# Patient Record
Sex: MALE | Race: WHITE | NOT HISPANIC OR LATINO
[De-identification: names, ages, dates, MRNs, and addresses within clinical notes are randomized per-mention and may not be internally consistent; named-entity substitution may affect disease eponyms.]

---

## 2019-03-15 PROBLEM — Z00.00 ENCOUNTER FOR PREVENTIVE HEALTH EXAMINATION: Status: ACTIVE | Noted: 2019-03-15

## 2020-03-04 ENCOUNTER — LABORATORY RESULT (OUTPATIENT)
Age: 40
End: 2020-03-04

## 2020-03-04 ENCOUNTER — APPOINTMENT (OUTPATIENT)
Dept: RHEUMATOLOGY | Facility: CLINIC | Age: 40
End: 2020-03-04
Payer: COMMERCIAL

## 2020-03-04 VITALS
WEIGHT: 215 LBS | SYSTOLIC BLOOD PRESSURE: 140 MMHG | BODY MASS INDEX: 29.12 KG/M2 | HEIGHT: 72 IN | TEMPERATURE: 98.1 F | DIASTOLIC BLOOD PRESSURE: 88 MMHG | RESPIRATION RATE: 17 BRPM

## 2020-03-04 PROCEDURE — 99245 OFF/OP CONSLTJ NEW/EST HI 55: CPT | Mod: 25

## 2020-03-04 PROCEDURE — 36415 COLL VENOUS BLD VENIPUNCTURE: CPT

## 2020-03-04 NOTE — PHYSICAL EXAM
[General Appearance - Alert] : alert [General Appearance - In No Acute Distress] : in no acute distress [General Appearance - Well Developed] : well developed [General Appearance - Well Nourished] : well nourished [Sclera] : the sclera and conjunctiva were normal [PERRL With Normal Accommodation] : pupils were equal in size, round, and reactive to light [Outer Ear] : the ears and nose were normal in appearance [Examination Of The Oral Cavity] : the lips and gums were normal [Oropharynx] : the oropharynx was normal [Neck Appearance] : the appearance of the neck was normal [Heart Rate And Rhythm] : heart rate was normal and rhythm regular [Auscultation Breath Sounds / Voice Sounds] : lungs were clear to auscultation bilaterally [Heart Sounds] : normal S1 and S2 [Heart Sounds Gallop] : no gallops [Murmurs] : no murmurs [Heart Sounds Pericardial Friction Rub] : no pericardial rub [Bowel Sounds] : normal bowel sounds [Abdomen Soft] : soft [Abdomen Tenderness] : non-tender [No CVA Tenderness] : no ~M costovertebral angle tenderness [No Spinal Tenderness] : no spinal tenderness [Nail Clubbing] : no clubbing  or cyanosis of the fingernails [Involuntary Movements] : no involuntary movements were seen [Motor Tone] : muscle strength and tone were normal [] : no rash [Skin Lesions] : no skin lesions [No Focal Deficits] : no focal deficits [Oriented To Time, Place, And Person] : oriented to person, place, and time [FreeTextEntry1] : Right ankle with mild swelling and warmth

## 2020-03-04 NOTE — ASSESSMENT
[FreeTextEntry1] : 39 year old male with PMH of gout- treated with allopurinol 50mg daily presenting today for an initial evaluation. Has acute Right ankle monoarthritis x 3 weeks now. Not currently taking any medications. PE with Right ankle mild synovitis and warmth. 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 \par \par - labs as below\par - Xray of BL ankles\par - prednisone taper  (reviewed risk and benefits of medications)\par - rest, ice, elevation of affected joint\par - will optimize ULT after reviewing labs\par \par Discussed treatment plan with the patient. The patient was given the opportunity to ask questions and all questions were answered to their satisfaction.

## 2020-03-04 NOTE — HISTORY OF PRESENT ILLNESS
[FreeTextEntry1] : 39 year old male with PMH as listed below presents today for an initial evaluation. \par \par Reports to have hx of gout dx > 7 years ago.  Was previously following rheumatologist (doesn’t remember name).  Treated with indomethacin, allopurinol 50mg daily ,colchicine 0.6 mg BID. \par \par Currently reports to have pain/swelling/erythema to Right ankle x 3 weeks now. Has tried colchicine 0.6 mg BID x 3-4 days with some improvement in symptoms. Currently off colchicine. Has had prior attacks to BL ankles, knees, elbows. Last attack > 6 months ago. denies hx of kidney stones. Currently on allopurinol 50mg daily. \par \par denies fever, chills, weight loss, weight gain, fatigue, malaise, denies dry eye,eye pain, eye redness, vision changes, dry mouth, oral ulcers, nasal congestion, difficulty swallowing,  denies ear pain, hearing changes, denies chest pain, chest palpitations, denies sob, denies nausea, vomiting, abdominal pain, diarrhea, constipation, blood in stool, denies dysuria, blood in the urine,denies rashes, photosensitivity, hair loss, skin thickening, denies blood clots,  raynauds

## 2020-03-18 ENCOUNTER — APPOINTMENT (OUTPATIENT)
Dept: RHEUMATOLOGY | Facility: CLINIC | Age: 40
End: 2020-03-18

## 2020-03-19 LAB
25(OH)D3 SERPL-MCNC: 32.3 NG/ML
ALBUMIN MFR SERPL ELPH: 58.5 %
ALBUMIN SERPL ELPH-MCNC: 4.9 G/DL
ALBUMIN SERPL-MCNC: 4.3 G/DL
ALBUMIN/GLOB SERPL: 1.4 RATIO
ALBUPE: 22.3 %
ALP BLD-CCNC: 58 U/L
ALPHA1 GLOB MFR SERPL ELPH: 4.6 %
ALPHA1 GLOB SERPL ELPH-MCNC: 0.3 G/DL
ALPHA1UPE: 38.4 %
ALPHA2 GLOB MFR SERPL ELPH: 9 %
ALPHA2 GLOB SERPL ELPH-MCNC: 0.7 G/DL
ALPHA2UPE: 18.2 %
ALT SERPL-CCNC: 14 U/L
ANION GAP SERPL CALC-SCNC: 14 MMOL/L
AST SERPL-CCNC: 14 U/L
B-GLOBULIN MFR SERPL ELPH: 12.5 %
B-GLOBULIN SERPL ELPH-MCNC: 0.9 G/DL
BASOPHILS # BLD AUTO: 0.08 K/UL
BASOPHILS NFR BLD AUTO: 0.9 %
BETAUPE: 7.4 %
BILIRUB SERPL-MCNC: 0.2 MG/DL
BUN SERPL-MCNC: 17 MG/DL
CALCIUM SERPL-MCNC: 9.8 MG/DL
CCP AB SER IA-ACNC: <8 UNITS
CHLORIDE SERPL-SCNC: 101 MMOL/L
CO2 SERPL-SCNC: 26 MMOL/L
CREAT 24H UR-MCNC: NORMAL G/24 H
CREAT SERPL-MCNC: 1.1 MG/DL
CREAT SPEC-SCNC: 76 MG/DL
CREAT/PROT UR: 0.1 RATIO
CREATININE UR (MAYO): 75 MG/DL
CRP SERPL-MCNC: 1.5 MG/DL
EOSINOPHIL # BLD AUTO: 0.13 K/UL
EOSINOPHIL NFR BLD AUTO: 1.5 %
ERYTHROCYTE [SEDIMENTATION RATE] IN BLOOD BY WESTERGREN METHOD: 14 MM/HR
GAMMA GLOB FLD ELPH-MCNC: 1.1 G/DL
GAMMA GLOB MFR SERPL ELPH: 15.4 %
GAMMAUPE: 13.7 %
GLUCOSE SERPL-MCNC: 104 MG/DL
HCT VFR BLD CALC: 47.3 %
HGB BLD-MCNC: 14.9 G/DL
IGA 24H UR QL IFE: NORMAL
IMM GRANULOCYTES NFR BLD AUTO: 0.3 %
INTERPRETATION SERPL IEP-IMP: NORMAL
KAPPA LC 24H UR QL: NORMAL
LYMPHOCYTES # BLD AUTO: 2.24 K/UL
LYMPHOCYTES NFR BLD AUTO: 25.7 %
M PROTEIN SPEC IFE-MCNC: NORMAL
MAN DIFF?: NORMAL
MCHC RBC-ENTMCNC: 29.1 PG
MCHC RBC-ENTMCNC: 31.5 GM/DL
MCV RBC AUTO: 92.4 FL
MONOCYTES # BLD AUTO: 0.42 K/UL
MONOCYTES NFR BLD AUTO: 4.8 %
NEUTROPHILS # BLD AUTO: 5.83 K/UL
NEUTROPHILS NFR BLD AUTO: 66.8 %
PLATELET # BLD AUTO: 297 K/UL
POTASSIUM SERPL-SCNC: 4.6 MMOL/L
PROT PATTERN 24H UR ELPH-IMP: NORMAL
PROT SERPL-MCNC: 7.4 G/DL
PROT UR-MCNC: 5 MG/DL
PROT UR-MCNC: <4 MG/DL
PROT UR-MCNC: <4 MG/DL
RBC # BLD: 5.12 M/UL
RBC # FLD: 12.4 %
RF+CCP IGG SER-IMP: NEGATIVE
RHEUMATOID FACT SER QL: <10 IU/ML
SODIUM SERPL-SCNC: 140 MMOL/L
SPECIMEN VOL 24H UR: NORMAL ML
TSH SERPL-ACNC: 2.93 UIU/ML
URATE SERPL-MCNC: 9 MG/DL
WBC # FLD AUTO: 8.73 K/UL

## 2020-03-23 ENCOUNTER — APPOINTMENT (OUTPATIENT)
Dept: RHEUMATOLOGY | Facility: CLINIC | Age: 40
End: 2020-03-23

## 2020-11-17 ENCOUNTER — APPOINTMENT (OUTPATIENT)
Dept: RHEUMATOLOGY | Facility: CLINIC | Age: 40
End: 2020-11-17
Payer: COMMERCIAL

## 2020-11-17 VITALS
HEART RATE: 80 BPM | HEIGHT: 72 IN | DIASTOLIC BLOOD PRESSURE: 82 MMHG | RESPIRATION RATE: 17 BRPM | WEIGHT: 220 LBS | OXYGEN SATURATION: 97 % | TEMPERATURE: 98.2 F | BODY MASS INDEX: 29.8 KG/M2 | SYSTOLIC BLOOD PRESSURE: 110 MMHG

## 2020-11-17 DIAGNOSIS — M13.10 MONOARTHRITIS, NOT ELSEWHERE CLASSIFIED, UNSPECIFIED SITE: ICD-10-CM

## 2020-11-17 DIAGNOSIS — M25.571 PAIN IN RIGHT ANKLE AND JOINTS OF RIGHT FOOT: ICD-10-CM

## 2020-11-17 DIAGNOSIS — M10.9 GOUT, UNSPECIFIED: ICD-10-CM

## 2020-11-17 DIAGNOSIS — Z79.899 OTHER LONG TERM (CURRENT) DRUG THERAPY: ICD-10-CM

## 2020-11-17 PROCEDURE — 99214 OFFICE O/P EST MOD 30 MIN: CPT | Mod: 25

## 2020-11-17 RX ORDER — COLCHICINE 0.6 MG/1
0.6 TABLET ORAL TWICE DAILY
Qty: 180 | Refills: 0 | Status: ACTIVE | COMMUNITY
Start: 2020-11-17 | End: 1900-01-01

## 2020-11-17 RX ORDER — ALLOPURINOL 100 MG/1
100 TABLET ORAL DAILY
Qty: 180 | Refills: 1 | Status: ACTIVE | COMMUNITY
Start: 2020-11-09 | End: 1900-01-01

## 2020-11-17 RX ORDER — OMEPRAZOLE 20 MG/1
20 CAPSULE, DELAYED RELEASE ORAL
Refills: 0 | Status: ACTIVE | COMMUNITY

## 2020-11-18 NOTE — ASSESSMENT
[FreeTextEntry1] : Gout\par No recent attacks\par Currently on allopurinol 200mg daily. Tolerating medication well. Denies side effects. \par Reports compliance with diet\par \par - repeat labs today. Goal UA <6\par - c/w allopurinol 200mg daily\par - prednisone prn for flares\par - will optimize ULT after reviewing labs\par \par Discussed treatment plan with the patient. The patient was given the opportunity to ask questions and all questions were answered to their satisfaction.

## 2020-11-18 NOTE — HISTORY OF PRESENT ILLNESS
[FreeTextEntry1] : Pt presenting today for a f.u visit. \par Currently on allopurinol 200mg daily. Tolerating medication well. Denies side effects. \par No recent gout attacks. Reports compliance with diet

## 2020-11-18 NOTE — PHYSICAL EXAM
[General Appearance - Alert] : alert [General Appearance - In No Acute Distress] : in no acute distress [General Appearance - Well Nourished] : well nourished [General Appearance - Well Developed] : well developed [Sclera] : the sclera and conjunctiva were normal [PERRL With Normal Accommodation] : pupils were equal in size, round, and reactive to light [Outer Ear] : the ears and nose were normal in appearance [Examination Of The Oral Cavity] : the lips and gums were normal [Oropharynx] : the oropharynx was normal [Neck Appearance] : the appearance of the neck was normal [Auscultation Breath Sounds / Voice Sounds] : lungs were clear to auscultation bilaterally [Heart Rate And Rhythm] : heart rate was normal and rhythm regular [Heart Sounds] : normal S1 and S2 [Heart Sounds Gallop] : no gallops [Murmurs] : no murmurs [Heart Sounds Pericardial Friction Rub] : no pericardial rub [Bowel Sounds] : normal bowel sounds [Abdomen Soft] : soft [Abdomen Tenderness] : non-tender [No CVA Tenderness] : no ~M costovertebral angle tenderness [No Spinal Tenderness] : no spinal tenderness [Nail Clubbing] : no clubbing  or cyanosis of the fingernails [Involuntary Movements] : no involuntary movements were seen [Motor Tone] : muscle strength and tone were normal [] : no rash [Skin Lesions] : no skin lesions [No Focal Deficits] : no focal deficits [Oriented To Time, Place, And Person] : oriented to person, place, and time [Abnormal Walk] : normal gait [Musculoskeletal - Swelling] : no joint swelling seen [Sensation] : the sensory exam was normal to light touch and pinprick [Motor Exam] : the motor exam was normal [Impaired Insight] : insight and judgment were intact [Affect] : the affect was normal [Mood] : the mood was normal

## 2020-12-14 LAB
ALBUMIN SERPL ELPH-MCNC: 4.8 G/DL
ALP BLD-CCNC: 67 U/L
ALT SERPL-CCNC: 30 U/L
ANION GAP SERPL CALC-SCNC: 10 MMOL/L
AST SERPL-CCNC: 30 U/L
BASOPHILS # BLD AUTO: 0.07 K/UL
BASOPHILS NFR BLD AUTO: 0.9 %
BILIRUB SERPL-MCNC: 0.5 MG/DL
BUN SERPL-MCNC: 14 MG/DL
CALCIUM SERPL-MCNC: 9.7 MG/DL
CHLORIDE SERPL-SCNC: 102 MMOL/L
CK SERPL-CCNC: 580 U/L
CO2 SERPL-SCNC: 27 MMOL/L
CREAT SERPL-MCNC: 1.2 MG/DL
CRP SERPL-MCNC: 1.9 MG/DL
EOSINOPHIL # BLD AUTO: 0.17 K/UL
EOSINOPHIL NFR BLD AUTO: 2.2 %
ERYTHROCYTE [SEDIMENTATION RATE] IN BLOOD BY WESTERGREN METHOD: 6 MM/HR
GLUCOSE SERPL-MCNC: 106 MG/DL
HCT VFR BLD CALC: 46.7 %
HGB BLD-MCNC: 15.4 G/DL
IMM GRANULOCYTES NFR BLD AUTO: 0.4 %
LYMPHOCYTES # BLD AUTO: 2.45 K/UL
LYMPHOCYTES NFR BLD AUTO: 31.7 %
MAN DIFF?: NORMAL
MCHC RBC-ENTMCNC: 30.1 PG
MCHC RBC-ENTMCNC: 33 GM/DL
MCV RBC AUTO: 91.2 FL
MONOCYTES # BLD AUTO: 0.65 K/UL
MONOCYTES NFR BLD AUTO: 8.4 %
NEUTROPHILS # BLD AUTO: 4.36 K/UL
NEUTROPHILS NFR BLD AUTO: 56.4 %
PLATELET # BLD AUTO: 266 K/UL
POTASSIUM SERPL-SCNC: 4.4 MMOL/L
PROT SERPL-MCNC: 6.7 G/DL
RBC # BLD: 5.12 M/UL
RBC # FLD: 12 %
SODIUM SERPL-SCNC: 139 MMOL/L
URATE SERPL-MCNC: 7.6 MG/DL
WBC # FLD AUTO: 7.73 K/UL

## 2021-02-22 ENCOUNTER — APPOINTMENT (OUTPATIENT)
Dept: RHEUMATOLOGY | Facility: CLINIC | Age: 41
End: 2021-02-22

## 2021-08-13 ENCOUNTER — APPOINTMENT (OUTPATIENT)
Dept: SURGERY | Facility: CLINIC | Age: 41
End: 2021-08-13
Payer: OTHER MISCELLANEOUS

## 2021-08-13 VITALS
DIASTOLIC BLOOD PRESSURE: 80 MMHG | RESPIRATION RATE: 15 BRPM | OXYGEN SATURATION: 96 % | HEIGHT: 72 IN | WEIGHT: 215 LBS | SYSTOLIC BLOOD PRESSURE: 126 MMHG | HEART RATE: 82 BPM | BODY MASS INDEX: 29.12 KG/M2

## 2021-08-13 DIAGNOSIS — Z87.891 PERSONAL HISTORY OF NICOTINE DEPENDENCE: ICD-10-CM

## 2021-08-13 DIAGNOSIS — Z78.9 OTHER SPECIFIED HEALTH STATUS: ICD-10-CM

## 2021-08-13 PROCEDURE — 99072 ADDL SUPL MATRL&STAF TM PHE: CPT

## 2021-08-13 PROCEDURE — 99244 OFF/OP CNSLTJ NEW/EST MOD 40: CPT

## 2021-08-13 RX ORDER — PREDNISONE 20 MG/1
20 TABLET ORAL DAILY
Qty: 30 | Refills: 0 | Status: COMPLETED | COMMUNITY
Start: 2020-03-04 | End: 2021-08-13

## 2021-08-13 NOTE — CONSULT LETTER
[Dear  ___] : Dear  [unfilled], [Consult Letter:] : I had the pleasure of evaluating your patient, [unfilled]. [Please see my note below.] : Please see my note below. [Consult Closing:] : Thank you very much for allowing me to participate in the care of this patient.  If you have any questions, please do not hesitate to contact me. [Sincerely,] : Sincerely, [FreeTextEntry3] : Wm Farhad SANCHEZ [DrTracie  ___] : Dr. MAST

## 2021-08-13 NOTE — HISTORY OF PRESENT ILLNESS
[de-identified] : 39 yo male with low back pain and LE radiation. Injured at work in 2011. Failed conservative tx. Now for spinal fusion at [facility].

## 2021-08-13 NOTE — REVIEW OF SYSTEMS
[Fever] : no fever [Chills] : no chills [Eye Pain] : no eye pain [Earache] : no earache [Chest Pain] : no chest pain [Shortness Of Breath] : no shortness of breath [Abdominal Pain] : no abdominal pain [Convulsions] : no convulsions [Muscle Weakness] : no muscle weakness [Easy Bleeding] : no tendency for easy bleeding [Easy Bruising] : no tendency for easy bruising

## 2021-08-13 NOTE — PHYSICAL EXAM
[JVD] : no jugular venous distention  [Normal Thyroid] : the thyroid was normal [Normal Breath Sounds] : Normal breath sounds [Normal Heart Sounds] : normal heart sounds [Abdomen Tenderness] : ~T ~M No abdominal tenderness [No Rash or Lesion] : No rash or lesion [Alert] : alert [Oriented to Person] : oriented to person [Oriented to Place] : oriented to place [Oriented to Time] : oriented to time [Calm] : calm [de-identified] : NAD [de-identified] : NC/At PER [de-identified] : no scars [de-identified] : no CVA tenderness [de-identified] : moves all 4 extr

## 2021-09-07 ENCOUNTER — OUTPATIENT (OUTPATIENT)
Dept: OUTPATIENT SERVICES | Facility: HOSPITAL | Age: 41
LOS: 1 days | End: 2021-09-07
Payer: COMMERCIAL

## 2021-09-07 VITALS
OXYGEN SATURATION: 99 % | DIASTOLIC BLOOD PRESSURE: 83 MMHG | SYSTOLIC BLOOD PRESSURE: 119 MMHG | WEIGHT: 216.05 LBS | HEART RATE: 71 BPM | RESPIRATION RATE: 16 BRPM | HEIGHT: 72 IN | TEMPERATURE: 98 F

## 2021-09-07 DIAGNOSIS — M51.26 OTHER INTERVERTEBRAL DISC DISPLACEMENT, LUMBAR REGION: ICD-10-CM

## 2021-09-07 DIAGNOSIS — M51.27 OTHER INTERVERTEBRAL DISC DISPLACEMENT, LUMBOSACRAL REGION: ICD-10-CM

## 2021-09-07 DIAGNOSIS — Z01.818 ENCOUNTER FOR OTHER PREPROCEDURAL EXAMINATION: ICD-10-CM

## 2021-09-07 DIAGNOSIS — Z98.890 OTHER SPECIFIED POSTPROCEDURAL STATES: Chronic | ICD-10-CM

## 2021-09-07 LAB
A1C WITH ESTIMATED AVERAGE GLUCOSE RESULT: 5.4 % — SIGNIFICANT CHANGE UP (ref 4–5.6)
ALBUMIN SERPL ELPH-MCNC: 4.1 G/DL — SIGNIFICANT CHANGE UP (ref 3.3–5)
ALP SERPL-CCNC: 64 U/L — SIGNIFICANT CHANGE UP (ref 40–120)
ALT FLD-CCNC: 30 U/L — SIGNIFICANT CHANGE UP (ref 12–78)
ANION GAP SERPL CALC-SCNC: 4 MMOL/L — LOW (ref 5–17)
APPEARANCE UR: CLEAR — SIGNIFICANT CHANGE UP
APTT BLD: 28.9 SEC — SIGNIFICANT CHANGE UP (ref 27.5–35.5)
AST SERPL-CCNC: 15 U/L — SIGNIFICANT CHANGE UP (ref 15–37)
BASOPHILS # BLD AUTO: 0.06 K/UL — SIGNIFICANT CHANGE UP (ref 0–0.2)
BASOPHILS NFR BLD AUTO: 0.8 % — SIGNIFICANT CHANGE UP (ref 0–2)
BILIRUB SERPL-MCNC: 0.2 MG/DL — SIGNIFICANT CHANGE UP (ref 0.2–1.2)
BILIRUB UR-MCNC: NEGATIVE — SIGNIFICANT CHANGE UP
BUN SERPL-MCNC: 22 MG/DL — SIGNIFICANT CHANGE UP (ref 7–23)
CALCIUM SERPL-MCNC: 9.2 MG/DL — SIGNIFICANT CHANGE UP (ref 8.5–10.1)
CHLORIDE SERPL-SCNC: 108 MMOL/L — SIGNIFICANT CHANGE UP (ref 96–108)
CO2 SERPL-SCNC: 28 MMOL/L — SIGNIFICANT CHANGE UP (ref 22–31)
COLOR SPEC: YELLOW — SIGNIFICANT CHANGE UP
CREAT SERPL-MCNC: 1.11 MG/DL — SIGNIFICANT CHANGE UP (ref 0.5–1.3)
DIFF PNL FLD: NEGATIVE — SIGNIFICANT CHANGE UP
EOSINOPHIL # BLD AUTO: 0.11 K/UL — SIGNIFICANT CHANGE UP (ref 0–0.5)
EOSINOPHIL NFR BLD AUTO: 1.4 % — SIGNIFICANT CHANGE UP (ref 0–6)
ESTIMATED AVERAGE GLUCOSE: 108 MG/DL — SIGNIFICANT CHANGE UP (ref 68–114)
GLUCOSE SERPL-MCNC: 89 MG/DL — SIGNIFICANT CHANGE UP (ref 70–99)
GLUCOSE UR QL: NEGATIVE MG/DL — SIGNIFICANT CHANGE UP
HCT VFR BLD CALC: 46.9 % — SIGNIFICANT CHANGE UP (ref 39–50)
HGB BLD-MCNC: 15.7 G/DL — SIGNIFICANT CHANGE UP (ref 13–17)
IMM GRANULOCYTES NFR BLD AUTO: 0.4 % — SIGNIFICANT CHANGE UP (ref 0–1.5)
INR BLD: 1.05 RATIO — SIGNIFICANT CHANGE UP (ref 0.88–1.16)
KETONES UR-MCNC: NEGATIVE — SIGNIFICANT CHANGE UP
LEUKOCYTE ESTERASE UR-ACNC: NEGATIVE — SIGNIFICANT CHANGE UP
LYMPHOCYTES # BLD AUTO: 1.94 K/UL — SIGNIFICANT CHANGE UP (ref 1–3.3)
LYMPHOCYTES # BLD AUTO: 24.9 % — SIGNIFICANT CHANGE UP (ref 13–44)
MCHC RBC-ENTMCNC: 29.6 PG — SIGNIFICANT CHANGE UP (ref 27–34)
MCHC RBC-ENTMCNC: 33.5 GM/DL — SIGNIFICANT CHANGE UP (ref 32–36)
MCV RBC AUTO: 88.3 FL — SIGNIFICANT CHANGE UP (ref 80–100)
MONOCYTES # BLD AUTO: 0.59 K/UL — SIGNIFICANT CHANGE UP (ref 0–0.9)
MONOCYTES NFR BLD AUTO: 7.6 % — SIGNIFICANT CHANGE UP (ref 2–14)
MRSA PCR RESULT.: SIGNIFICANT CHANGE UP
NEUTROPHILS # BLD AUTO: 5.07 K/UL — SIGNIFICANT CHANGE UP (ref 1.8–7.4)
NEUTROPHILS NFR BLD AUTO: 64.9 % — SIGNIFICANT CHANGE UP (ref 43–77)
NITRITE UR-MCNC: NEGATIVE — SIGNIFICANT CHANGE UP
PH UR: 5 — SIGNIFICANT CHANGE UP (ref 5–8)
PLATELET # BLD AUTO: 247 K/UL — SIGNIFICANT CHANGE UP (ref 150–400)
POTASSIUM SERPL-MCNC: 4.3 MMOL/L — SIGNIFICANT CHANGE UP (ref 3.5–5.3)
POTASSIUM SERPL-SCNC: 4.3 MMOL/L — SIGNIFICANT CHANGE UP (ref 3.5–5.3)
PROT SERPL-MCNC: 7.7 GM/DL — SIGNIFICANT CHANGE UP (ref 6–8.3)
PROT UR-MCNC: NEGATIVE MG/DL — SIGNIFICANT CHANGE UP
PROTHROM AB SERPL-ACNC: 12.1 SEC — SIGNIFICANT CHANGE UP (ref 10.6–13.6)
RBC # BLD: 5.31 M/UL — SIGNIFICANT CHANGE UP (ref 4.2–5.8)
RBC # FLD: 12.7 % — SIGNIFICANT CHANGE UP (ref 10.3–14.5)
S AUREUS DNA NOSE QL NAA+PROBE: DETECTED
SODIUM SERPL-SCNC: 140 MMOL/L — SIGNIFICANT CHANGE UP (ref 135–145)
SP GR SPEC: 1.01 — SIGNIFICANT CHANGE UP (ref 1.01–1.02)
UROBILINOGEN FLD QL: NEGATIVE MG/DL — SIGNIFICANT CHANGE UP
WBC # BLD: 7.8 K/UL — SIGNIFICANT CHANGE UP (ref 3.8–10.5)
WBC # FLD AUTO: 7.8 K/UL — SIGNIFICANT CHANGE UP (ref 3.8–10.5)

## 2021-09-07 PROCEDURE — 81003 URINALYSIS AUTO W/O SCOPE: CPT

## 2021-09-07 PROCEDURE — 85730 THROMBOPLASTIN TIME PARTIAL: CPT

## 2021-09-07 PROCEDURE — 86900 BLOOD TYPING SEROLOGIC ABO: CPT

## 2021-09-07 PROCEDURE — 86850 RBC ANTIBODY SCREEN: CPT

## 2021-09-07 PROCEDURE — 93010 ELECTROCARDIOGRAM REPORT: CPT

## 2021-09-07 PROCEDURE — 83036 HEMOGLOBIN GLYCOSYLATED A1C: CPT

## 2021-09-07 PROCEDURE — 86901 BLOOD TYPING SEROLOGIC RH(D): CPT

## 2021-09-07 PROCEDURE — 85025 COMPLETE CBC W/AUTO DIFF WBC: CPT

## 2021-09-07 PROCEDURE — 80053 COMPREHEN METABOLIC PANEL: CPT

## 2021-09-07 PROCEDURE — 85610 PROTHROMBIN TIME: CPT

## 2021-09-07 PROCEDURE — 71046 X-RAY EXAM CHEST 2 VIEWS: CPT

## 2021-09-07 PROCEDURE — 87086 URINE CULTURE/COLONY COUNT: CPT

## 2021-09-07 PROCEDURE — 93005 ELECTROCARDIOGRAM TRACING: CPT

## 2021-09-07 PROCEDURE — 87641 MR-STAPH DNA AMP PROBE: CPT

## 2021-09-07 PROCEDURE — 36415 COLL VENOUS BLD VENIPUNCTURE: CPT

## 2021-09-07 PROCEDURE — 71046 X-RAY EXAM CHEST 2 VIEWS: CPT | Mod: 26

## 2021-09-07 PROCEDURE — 87640 STAPH A DNA AMP PROBE: CPT

## 2021-09-07 NOTE — H&P PST ADULT - ASSESSMENT
40 years old male present to PST prior to anterior diskectomy and fusion L5-S1 with Dr. Li.    Plan   1. NPO after midnight  2. Covid swab scheduled for 2021  3. Use E-Z sponge as directed  4. Use Mupirocin as directed.  5. Drink a quart of extra  fluids the day before your surgery.  6 Medical optimization for surgery with Dr. Hoening  7. CBC, CMP, PT/ INR and PTT, Urinalysis, Urine Culture, Type and Screen, MRSA sent to lab  8. EKG and Chest x- ray done    CAPRINI SCORE [CLOT]    AGE RELATED RISK FACTORS                                                       MOBILITY RELATED FACTORS  [ ] Age 41-60 years                                            (1 Point)                  [ ] Bed rest                                                        (1 Point)  [ ] Age: 61-74 years                                           (2 Points)                 [ ] Plaster cast                                                   (2 Points)  [ ] Age= 75 years                                              (3 Points)                 [ ] Bed bound for more than 72 hours                 (2 Points)    DISEASE RELATED RISK FACTORS                                               GENDER SPECIFIC FACTORS  [ ] Edema in the lower extremities                       (1 Point)                  [ ] Pregnancy                                                     (1 Point)  [ ] Varicose veins                                               (1 Point)                  [ ] Post-partum < 6 weeks                                   (1 Point)             [ x] BMI > 25 Kg/m2                                            (1 Point)                  [ ] Hormonal therapy  or oral contraception          (1 Point)                 [ ] Sepsis (in the previous month)                        (1 Point)                  [ ] History of pregnancy complications                 (1 point)  [ ] Pneumonia or serious lung disease                                               [ ] Unexplained or recurrent                     (1 Point)           (in the previous month)                               (1 Point)  [ ] Abnormal pulmonary function test                     (1 Point)                 SURGERY RELATED RISK FACTORS  [ ] Acute myocardial infarction                              (1 Point)                 [ ]  Section                                             (1 Point)  [ ] Congestive heart failure (in the previous month)  (1 Point)               [ ] Minor surgery                                                  (1 Point)   [ ] Inflammatory bowel disease                             (1 Point)                 [ ] Arthroscopic surgery                                        (2 Points)  [ ] Central venous access                                      (2 Points)                [x ] General surgery lasting more than 45 minutes   (2 Points)       [ ] Stroke (in the previous month)                          (5 Points)               [ ] Elective arthroplasty                                         (5 Points)            [ ] malignancy present or previous                      (2 points)                                                                                                                                   HEMATOLOGY RELATED FACTORS                                                 TRAUMA RELATED RISK FACTORS  [ ] Prior episodes of VTE                                     (3 Points)                 [ ] Fracture of the hip, pelvis, or leg                       (5 Points)  [ ] Positive family history for VTE                         (3 Points)                 [ ] Acute spinal cord injury (in the previous month)  (5 Points)  [ ] Prothrombin 19923 A                                     (3 Points)                 [ ] Paralysis  (less than 1 month)                             (5 Points)  [ ] Factor V Leiden                                             (3 Points)                  [ ] Multiple Trauma within 1 month                        (5 Points)  [ ] Lupus anticoagulants                                     (3 Points)                                                           [ ] Anticardiolipin antibodies                               (3 Points)                                                       [ ] High homocysteine in the blood                      (3 Points)                                             [ ] Other congenital or acquired thrombophilia      (3 Points)                                                [ ] Heparin induced thrombocytopenia                  (3 Points)                                          Total Score [   3       ]

## 2021-09-07 NOTE — H&P PST ADULT - NSICDXPASTSURGICALHX_GEN_ALL_CORE_FT
PAST SURGICAL HISTORY:  H/O repair of right rotator cuff 5/8/2021    History of endoscopy upper 2020?

## 2021-09-07 NOTE — H&P PST ADULT - NSICDXFAMILYHX_GEN_ALL_CORE_FT
FAMILY HISTORY:  Father  Still living? Yes, Estimated age: 71-80  Family history of hypertension, Age at diagnosis: Age Unknown  Family history of Parkinson's disease, Age at diagnosis: Age Unknown  Family history of renal disease, Age at diagnosis: Age Unknown    Mother  Still living? Yes, Estimated age: 71-80  Family history of rheumatoid arthritis, Age at diagnosis: Age Unknown

## 2021-09-07 NOTE — H&P PST ADULT - NSICDXPASTMEDICALHX_GEN_ALL_CORE_FT
PAST MEDICAL HISTORY:  Discogenic lumbar pain     Gout     Hiatal hernia with GERD     HNP (herniated nucleus pulposus), lumbar L5-S1    Lower back pain     Spondylolisthesis, lumbar region L5-S1

## 2021-09-07 NOTE — H&P PST ADULT - NSANTHOSAYNRD_GEN_A_CORE
No. LESLY screening performed.  STOP BANG Legend: 0-2 = LOW Risk; 3-4 = INTERMEDIATE Risk; 5-8 = HIGH Risk

## 2021-09-07 NOTE — H&P PST ADULT - HISTORY OF PRESENT ILLNESS
40 years old male with spondylolisthesis at L5-S1, discogenic syndrome, lumbar and HNP, L5-S1. Admits to lower back pain for several years. Typical more pain to the left radiating to left posterior knee. Patient was walking in his backyard on Saturday, 9/4/2021 and walked on uneven ground. Constant aching, sharp , stabbing pain across entire lower back. Denies pain medications. Planned Anterior discectomy and fusion L5-S1.

## 2021-09-08 DIAGNOSIS — Z01.818 ENCOUNTER FOR OTHER PREPROCEDURAL EXAMINATION: ICD-10-CM

## 2021-09-08 DIAGNOSIS — M51.27 OTHER INTERVERTEBRAL DISC DISPLACEMENT, LUMBOSACRAL REGION: ICD-10-CM

## 2021-09-08 DIAGNOSIS — M51.26 OTHER INTERVERTEBRAL DISC DISPLACEMENT, LUMBAR REGION: ICD-10-CM

## 2021-09-08 LAB
CULTURE RESULTS: NO GROWTH — SIGNIFICANT CHANGE UP
SPECIMEN SOURCE: SIGNIFICANT CHANGE UP

## 2021-09-11 DIAGNOSIS — Z01.818 ENCOUNTER FOR OTHER PREPROCEDURAL EXAMINATION: ICD-10-CM

## 2021-09-12 ENCOUNTER — APPOINTMENT (OUTPATIENT)
Dept: DISASTER EMERGENCY | Facility: CLINIC | Age: 41
End: 2021-09-12

## 2021-09-13 LAB — SARS-COV-2 N GENE NPH QL NAA+PROBE: NOT DETECTED

## 2021-09-14 RX ORDER — ONDANSETRON 8 MG/1
4 TABLET, FILM COATED ORAL ONCE
Refills: 0 | Status: DISCONTINUED | OUTPATIENT
Start: 2021-09-15 | End: 2021-09-15

## 2021-09-14 RX ORDER — SODIUM CHLORIDE 9 MG/ML
3 INJECTION INTRAMUSCULAR; INTRAVENOUS; SUBCUTANEOUS EVERY 8 HOURS
Refills: 0 | Status: DISCONTINUED | OUTPATIENT
Start: 2021-09-15 | End: 2021-09-18

## 2021-09-14 RX ORDER — OXYCODONE HYDROCHLORIDE 5 MG/1
5 TABLET ORAL ONCE
Refills: 0 | Status: DISCONTINUED | OUTPATIENT
Start: 2021-09-15 | End: 2021-09-15

## 2021-09-14 RX ORDER — MEPERIDINE HYDROCHLORIDE 50 MG/ML
12.5 INJECTION INTRAMUSCULAR; INTRAVENOUS; SUBCUTANEOUS
Refills: 0 | Status: DISCONTINUED | OUTPATIENT
Start: 2021-09-15 | End: 2021-09-15

## 2021-09-14 RX ORDER — SODIUM CHLORIDE 9 MG/ML
1000 INJECTION INTRAMUSCULAR; INTRAVENOUS; SUBCUTANEOUS
Refills: 0 | Status: DISCONTINUED | OUTPATIENT
Start: 2021-09-15 | End: 2021-09-15

## 2021-09-15 ENCOUNTER — APPOINTMENT (OUTPATIENT)
Dept: SURGERY | Facility: HOSPITAL | Age: 41
End: 2021-09-15

## 2021-09-15 ENCOUNTER — INPATIENT (INPATIENT)
Facility: HOSPITAL | Age: 41
LOS: 2 days | Discharge: HOME | DRG: 304 | End: 2021-09-18
Attending: ORTHOPAEDIC SURGERY | Admitting: ORTHOPAEDIC SURGERY
Payer: COMMERCIAL

## 2021-09-15 ENCOUNTER — RESULT REVIEW (OUTPATIENT)
Age: 41
End: 2021-09-15

## 2021-09-15 VITALS
SYSTOLIC BLOOD PRESSURE: 129 MMHG | HEIGHT: 72 IN | OXYGEN SATURATION: 98 % | TEMPERATURE: 98 F | RESPIRATION RATE: 15 BRPM | WEIGHT: 216.05 LBS | HEART RATE: 69 BPM | DIASTOLIC BLOOD PRESSURE: 90 MMHG

## 2021-09-15 DIAGNOSIS — Z98.890 OTHER SPECIFIED POSTPROCEDURAL STATES: Chronic | ICD-10-CM

## 2021-09-15 DIAGNOSIS — M51.27 OTHER INTERVERTEBRAL DISC DISPLACEMENT, LUMBOSACRAL REGION: ICD-10-CM

## 2021-09-15 DIAGNOSIS — M43.17 SPONDYLOLISTHESIS, LUMBOSACRAL REGION: ICD-10-CM

## 2021-09-15 DIAGNOSIS — M51.26 OTHER INTERVERTEBRAL DISC DISPLACEMENT, LUMBAR REGION: ICD-10-CM

## 2021-09-15 PROCEDURE — 88304 TISSUE EXAM BY PATHOLOGIST: CPT | Mod: 26

## 2021-09-15 PROCEDURE — 80048 BASIC METABOLIC PNL TOTAL CA: CPT

## 2021-09-15 PROCEDURE — 36415 COLL VENOUS BLD VENIPUNCTURE: CPT

## 2021-09-15 PROCEDURE — C1713: CPT

## 2021-09-15 PROCEDURE — 76000 FLUOROSCOPY <1 HR PHYS/QHP: CPT

## 2021-09-15 PROCEDURE — 86891 AUTOLOGOUS BLOOD OP SALVAGE: CPT

## 2021-09-15 PROCEDURE — 22558 ARTHRD ANT NTRBD MIN DSC LUM: CPT | Mod: 62

## 2021-09-15 PROCEDURE — 85027 COMPLETE CBC AUTOMATED: CPT

## 2021-09-15 PROCEDURE — 97530 THERAPEUTIC ACTIVITIES: CPT | Mod: GP

## 2021-09-15 PROCEDURE — 82962 GLUCOSE BLOOD TEST: CPT

## 2021-09-15 PROCEDURE — 85025 COMPLETE CBC W/AUTO DIFF WBC: CPT

## 2021-09-15 PROCEDURE — 97116 GAIT TRAINING THERAPY: CPT | Mod: GP

## 2021-09-15 PROCEDURE — 88304 TISSUE EXAM BY PATHOLOGIST: CPT

## 2021-09-15 PROCEDURE — 97162 PT EVAL MOD COMPLEX 30 MIN: CPT | Mod: GP

## 2021-09-15 PROCEDURE — 86769 SARS-COV-2 COVID-19 ANTIBODY: CPT

## 2021-09-15 PROCEDURE — C1889: CPT

## 2021-09-15 RX ORDER — OXYCODONE HYDROCHLORIDE 5 MG/1
10 TABLET ORAL EVERY 4 HOURS
Refills: 0 | Status: DISCONTINUED | OUTPATIENT
Start: 2021-09-15 | End: 2021-09-18

## 2021-09-15 RX ORDER — HYDROMORPHONE HYDROCHLORIDE 2 MG/ML
0.5 INJECTION INTRAMUSCULAR; INTRAVENOUS; SUBCUTANEOUS
Refills: 0 | Status: DISCONTINUED | OUTPATIENT
Start: 2021-09-15 | End: 2021-09-15

## 2021-09-15 RX ORDER — ACETAMINOPHEN 500 MG
1000 TABLET ORAL ONCE
Refills: 0 | Status: COMPLETED | OUTPATIENT
Start: 2021-09-16 | End: 2021-09-16

## 2021-09-15 RX ORDER — ACETAMINOPHEN 500 MG
975 TABLET ORAL ONCE
Refills: 0 | Status: COMPLETED | OUTPATIENT
Start: 2021-09-15 | End: 2021-09-15

## 2021-09-15 RX ORDER — GABAPENTIN 400 MG/1
300 CAPSULE ORAL EVERY 8 HOURS
Refills: 0 | Status: DISCONTINUED | OUTPATIENT
Start: 2021-09-15 | End: 2021-09-18

## 2021-09-15 RX ORDER — OXYCODONE HYDROCHLORIDE 5 MG/1
5 TABLET ORAL EVERY 4 HOURS
Refills: 0 | Status: DISCONTINUED | OUTPATIENT
Start: 2021-09-15 | End: 2021-09-18

## 2021-09-15 RX ORDER — HYDROMORPHONE HYDROCHLORIDE 2 MG/ML
0.5 INJECTION INTRAMUSCULAR; INTRAVENOUS; SUBCUTANEOUS
Refills: 0 | Status: DISCONTINUED | OUTPATIENT
Start: 2021-09-15 | End: 2021-09-18

## 2021-09-15 RX ORDER — PANTOPRAZOLE SODIUM 20 MG/1
40 TABLET, DELAYED RELEASE ORAL DAILY
Refills: 0 | Status: DISCONTINUED | OUTPATIENT
Start: 2021-09-15 | End: 2021-09-18

## 2021-09-15 RX ORDER — ALLOPURINOL 300 MG
1 TABLET ORAL
Qty: 0 | Refills: 0 | DISCHARGE

## 2021-09-15 RX ORDER — MAGNESIUM HYDROXIDE 400 MG/1
30 TABLET, CHEWABLE ORAL EVERY 12 HOURS
Refills: 0 | Status: DISCONTINUED | OUTPATIENT
Start: 2021-09-15 | End: 2021-09-18

## 2021-09-15 RX ORDER — ACETAMINOPHEN 500 MG
1000 TABLET ORAL ONCE
Refills: 0 | Status: COMPLETED | OUTPATIENT
Start: 2021-09-16 | End: 2021-09-15

## 2021-09-15 RX ORDER — ONDANSETRON 8 MG/1
4 TABLET, FILM COATED ORAL EVERY 6 HOURS
Refills: 0 | Status: DISCONTINUED | OUTPATIENT
Start: 2021-09-15 | End: 2021-09-18

## 2021-09-15 RX ORDER — FOLIC ACID 0.8 MG
1 TABLET ORAL DAILY
Refills: 0 | Status: DISCONTINUED | OUTPATIENT
Start: 2021-09-15 | End: 2021-09-18

## 2021-09-15 RX ORDER — OXYCODONE HYDROCHLORIDE 5 MG/1
10 TABLET ORAL ONCE
Refills: 0 | Status: DISCONTINUED | OUTPATIENT
Start: 2021-09-15 | End: 2021-09-15

## 2021-09-15 RX ORDER — ALLOPURINOL 300 MG
100 TABLET ORAL DAILY
Refills: 0 | Status: DISCONTINUED | OUTPATIENT
Start: 2021-09-15 | End: 2021-09-18

## 2021-09-15 RX ORDER — ASCORBIC ACID 60 MG
500 TABLET,CHEWABLE ORAL
Refills: 0 | Status: DISCONTINUED | OUTPATIENT
Start: 2021-09-15 | End: 2021-09-18

## 2021-09-15 RX ORDER — CEFAZOLIN SODIUM 1 G
2000 VIAL (EA) INJECTION EVERY 8 HOURS
Refills: 0 | Status: COMPLETED | OUTPATIENT
Start: 2021-09-15 | End: 2021-09-15

## 2021-09-15 RX ORDER — SODIUM CHLORIDE 9 MG/ML
1000 INJECTION, SOLUTION INTRAVENOUS
Refills: 0 | Status: DISCONTINUED | OUTPATIENT
Start: 2021-09-15 | End: 2021-09-17

## 2021-09-15 RX ORDER — CELECOXIB 200 MG/1
200 CAPSULE ORAL
Refills: 0 | Status: COMPLETED | OUTPATIENT
Start: 2021-09-15 | End: 2021-09-18

## 2021-09-15 RX ORDER — FAMOTIDINE 10 MG/ML
20 INJECTION INTRAVENOUS ONCE
Refills: 0 | Status: COMPLETED | OUTPATIENT
Start: 2021-09-15 | End: 2021-09-15

## 2021-09-15 RX ORDER — SENNA PLUS 8.6 MG/1
2 TABLET ORAL AT BEDTIME
Refills: 0 | Status: DISCONTINUED | OUTPATIENT
Start: 2021-09-15 | End: 2021-09-18

## 2021-09-15 RX ORDER — CELECOXIB 200 MG/1
200 CAPSULE ORAL ONCE
Refills: 0 | Status: COMPLETED | OUTPATIENT
Start: 2021-09-15 | End: 2021-09-15

## 2021-09-15 RX ORDER — TRAMADOL HYDROCHLORIDE 50 MG/1
50 TABLET ORAL EVERY 4 HOURS
Refills: 0 | Status: DISCONTINUED | OUTPATIENT
Start: 2021-09-15 | End: 2021-09-18

## 2021-09-15 RX ORDER — CYCLOBENZAPRINE HYDROCHLORIDE 10 MG/1
10 TABLET, FILM COATED ORAL EVERY 8 HOURS
Refills: 0 | Status: DISCONTINUED | OUTPATIENT
Start: 2021-09-15 | End: 2021-09-18

## 2021-09-15 RX ORDER — ACETAMINOPHEN 500 MG
1000 TABLET ORAL ONCE
Refills: 0 | Status: COMPLETED | OUTPATIENT
Start: 2021-09-15 | End: 2021-09-15

## 2021-09-15 RX ORDER — HYDROMORPHONE HYDROCHLORIDE 2 MG/ML
0.5 INJECTION INTRAMUSCULAR; INTRAVENOUS; SUBCUTANEOUS EVERY 4 HOURS
Refills: 0 | Status: DISCONTINUED | OUTPATIENT
Start: 2021-09-15 | End: 2021-09-18

## 2021-09-15 RX ADMIN — OXYCODONE HYDROCHLORIDE 10 MILLIGRAM(S): 5 TABLET ORAL at 14:31

## 2021-09-15 RX ADMIN — OXYCODONE HYDROCHLORIDE 10 MILLIGRAM(S): 5 TABLET ORAL at 07:11

## 2021-09-15 RX ADMIN — FAMOTIDINE 20 MILLIGRAM(S): 10 INJECTION INTRAVENOUS at 07:11

## 2021-09-15 RX ADMIN — HYDROMORPHONE HYDROCHLORIDE 0.5 MILLIGRAM(S): 2 INJECTION INTRAMUSCULAR; INTRAVENOUS; SUBCUTANEOUS at 11:55

## 2021-09-15 RX ADMIN — SENNA PLUS 2 TABLET(S): 8.6 TABLET ORAL at 21:50

## 2021-09-15 RX ADMIN — HYDROMORPHONE HYDROCHLORIDE 0.5 MILLIGRAM(S): 2 INJECTION INTRAMUSCULAR; INTRAVENOUS; SUBCUTANEOUS at 12:41

## 2021-09-15 RX ADMIN — Medication 400 MILLIGRAM(S): at 12:35

## 2021-09-15 RX ADMIN — HYDROMORPHONE HYDROCHLORIDE 0.5 MILLIGRAM(S): 2 INJECTION INTRAMUSCULAR; INTRAVENOUS; SUBCUTANEOUS at 16:34

## 2021-09-15 RX ADMIN — HYDROMORPHONE HYDROCHLORIDE 0.5 MILLIGRAM(S): 2 INJECTION INTRAMUSCULAR; INTRAVENOUS; SUBCUTANEOUS at 11:45

## 2021-09-15 RX ADMIN — HYDROMORPHONE HYDROCHLORIDE 0.5 MILLIGRAM(S): 2 INJECTION INTRAMUSCULAR; INTRAVENOUS; SUBCUTANEOUS at 20:31

## 2021-09-15 RX ADMIN — HYDROMORPHONE HYDROCHLORIDE 0.5 MILLIGRAM(S): 2 INJECTION INTRAMUSCULAR; INTRAVENOUS; SUBCUTANEOUS at 12:09

## 2021-09-15 RX ADMIN — HYDROMORPHONE HYDROCHLORIDE 0.5 MILLIGRAM(S): 2 INJECTION INTRAMUSCULAR; INTRAVENOUS; SUBCUTANEOUS at 11:35

## 2021-09-15 RX ADMIN — CYCLOBENZAPRINE HYDROCHLORIDE 10 MILLIGRAM(S): 10 TABLET, FILM COATED ORAL at 21:50

## 2021-09-15 RX ADMIN — CELECOXIB 200 MILLIGRAM(S): 200 CAPSULE ORAL at 21:50

## 2021-09-15 RX ADMIN — Medication 1000 MILLIGRAM(S): at 14:20

## 2021-09-15 RX ADMIN — HYDROMORPHONE HYDROCHLORIDE 0.5 MILLIGRAM(S): 2 INJECTION INTRAMUSCULAR; INTRAVENOUS; SUBCUTANEOUS at 20:46

## 2021-09-15 RX ADMIN — OXYCODONE HYDROCHLORIDE 10 MILLIGRAM(S): 5 TABLET ORAL at 12:36

## 2021-09-15 RX ADMIN — Medication 100 MILLIGRAM(S): at 16:35

## 2021-09-15 RX ADMIN — CELECOXIB 200 MILLIGRAM(S): 200 CAPSULE ORAL at 21:51

## 2021-09-15 RX ADMIN — Medication 1000 MILLIGRAM(S): at 21:51

## 2021-09-15 RX ADMIN — HYDROMORPHONE HYDROCHLORIDE 0.5 MILLIGRAM(S): 2 INJECTION INTRAMUSCULAR; INTRAVENOUS; SUBCUTANEOUS at 16:49

## 2021-09-15 RX ADMIN — HYDROMORPHONE HYDROCHLORIDE 0.5 MILLIGRAM(S): 2 INJECTION INTRAMUSCULAR; INTRAVENOUS; SUBCUTANEOUS at 14:45

## 2021-09-15 RX ADMIN — Medication 100 MILLIGRAM(S): at 23:09

## 2021-09-15 RX ADMIN — GABAPENTIN 300 MILLIGRAM(S): 400 CAPSULE ORAL at 21:50

## 2021-09-15 RX ADMIN — SODIUM CHLORIDE 3 MILLILITER(S): 9 INJECTION INTRAMUSCULAR; INTRAVENOUS; SUBCUTANEOUS at 21:17

## 2021-09-15 RX ADMIN — CELECOXIB 200 MILLIGRAM(S): 200 CAPSULE ORAL at 07:11

## 2021-09-15 RX ADMIN — Medication 500 MILLIGRAM(S): at 21:50

## 2021-09-15 RX ADMIN — MEPERIDINE HYDROCHLORIDE 12.5 MILLIGRAM(S): 50 INJECTION INTRAMUSCULAR; INTRAVENOUS; SUBCUTANEOUS at 12:42

## 2021-09-15 RX ADMIN — Medication 975 MILLIGRAM(S): at 07:10

## 2021-09-15 RX ADMIN — Medication 400 MILLIGRAM(S): at 21:50

## 2021-09-15 RX ADMIN — HYDROMORPHONE HYDROCHLORIDE 0.5 MILLIGRAM(S): 2 INJECTION INTRAMUSCULAR; INTRAVENOUS; SUBCUTANEOUS at 15:00

## 2021-09-15 RX ADMIN — CYCLOBENZAPRINE HYDROCHLORIDE 10 MILLIGRAM(S): 10 TABLET, FILM COATED ORAL at 14:45

## 2021-09-15 RX ADMIN — MEPERIDINE HYDROCHLORIDE 12.5 MILLIGRAM(S): 50 INJECTION INTRAMUSCULAR; INTRAVENOUS; SUBCUTANEOUS at 11:35

## 2021-09-15 RX ADMIN — Medication 975 MILLIGRAM(S): at 07:11

## 2021-09-15 NOTE — BRIEF OPERATIVE NOTE - NSICDXBRIEFPROCEDURE_GEN_ALL_CORE_FT
PROCEDURES:  ALIF, 1 level 15-Sep-2021 11:32:10 L5-S1 and posterior percutaneous pedicle screw instrumentation Min, Elizabeth

## 2021-09-15 NOTE — PATIENT PROFILE ADULT - CENTRAL VENOUS CATHETER/PICC LINE
Can take cephalexin three times a day for 10 days    Do mupirocin apply three times a day for 7 days
no
Negative

## 2021-09-15 NOTE — PROGRESS NOTE ADULT - SUBJECTIVE AND OBJECTIVE BOX
Post op check    Awake alert  C/O back spasms, pain  Incisional pain    Afeb, BP,P stable    No LE symptoms    Motor sens exam intact LE  LE well perfused  Dsg dry  Abd exam no distension, distant BS     - OK POD #0   - Pain control   - Mobilize in AM with PT   - Sips of clears with meds   - Consider advancing diet when bowel sounds return    RUDDY Li MD

## 2021-09-15 NOTE — PATIENT PROFILE ADULT - HOW PATIENT ADDRESSED, PROFILE
You can access the FollowMyHealth Patient Portal offered by [facility] by registering at the following website: [email or web address]. By joining [email or web address]’s FollowMyHealth portal, you will also be able to view your health information using other applications (apps) compatible with our system.
Edgar

## 2021-09-15 NOTE — PROGRESS NOTE ADULT - SUBJECTIVE AND OBJECTIVE BOX
Postop Check    Patient tolerated the procedure well. Patient seen and examined at bedside. No acute complaints at this time. Pain well controlled. Denies weakness, numbness or tingling. Denies chest pain, shortness of breath, nausea or vomiting.     PE:  Vital Signs Last 24 Hrs  T(C): 36.9 (09-15-21 @ 15:00), Max: 36.9 (09-15-21 @ 15:00)  T(F): 98.5 (09-15-21 @ 15:00), Max: 98.5 (09-15-21 @ 15:00)  HR: 66 (09-15-21 @ 15:00) (59 - 73)  BP: 138/85 (09-15-21 @ 15:00) (111/69 - 147/70)  BP(mean): --  RR: 13 (09-15-21 @ 15:00) (9 - 16)  SpO2: 100% (09-15-21 @ 15:00) (98% - 100%)    General: NAD, resting comforatbly in bed  Dressing C/D/I  + DP Pulses    Motor:                     L2                  L3             L4              L5            S1  R            5/5                5/5             5/5            5/5          5/5  L             5/5                5/5            5/5            5/5          5/5    Sensory:               L2          L3         L4      L5       S1         (0=absent, 1=impaired, 2=normal, NT=not testable)  R         2            2            2        2        2  L          2            2           2        2         2                    A/P:  40y m s/p ALIF L5-S1 POD 0    -Ice Chips/Sips of water with medications until passing gas  -9/16, possibly advance to full liquid diet if passing gas  -PT/OT  -Dockery  -WBAT b/l LE  -Pain Control PRN  -No Chemical DVT ppx due to risk of epidural hematoma  -SCDs OK  -Continue perioperative abx x 24 hours  -FU AM Labs  -Incentive Spirometry Postop Check    Patient tolerated the procedure well. Patient seen and examined at bedside. States back feels "tight". Pain well controlled. Denies weakness, numbness or tingling. Denies headache dizziness chest pain, shortness of breath, nausea or vomiting. No acute complaints at this time.     PE:  Vital Signs Last 24 Hrs  T(C): 36.9 (09-15-21 @ 15:00), Max: 36.9 (09-15-21 @ 15:00)  T(F): 98.5 (09-15-21 @ 15:00), Max: 98.5 (09-15-21 @ 15:00)  HR: 66 (09-15-21 @ 15:00) (59 - 73)  BP: 138/85 (09-15-21 @ 15:00) (111/69 - 147/70)  BP(mean): --  RR: 13 (09-15-21 @ 15:00) (9 - 16)  SpO2: 100% (09-15-21 @ 15:00) (98% - 100%)    General: NAD, resting comforatbly in bed  Dressing C/D/I  + DP Pulses    Motor:                     L2                  L3             L4              L5            S1  R            5/5                5/5             5/5            5/5          5/5  L             5/5                5/5            5/5            5/5          5/5    Sensory:               L2          L3         L4      L5       S1         (0=absent, 1=impaired, 2=normal, NT=not testable)  R         2            2            2        2        2  L          2            2           2        2         2                    A/P:  40y m s/p ALIF L5-S1 POD 0    -Ice Chips/Sips of water with medications until passing gas  -9/16, possibly advance to full liquid diet if passing gas  -PT/OT  -Dockery  -WBAT b/l LE  -Pain Control PRN  -No Chemical DVT ppx due to risk of epidural hematoma  -SCDs OK  -Continue perioperative abx x 24 hours  -FU AM Labs  -Incentive Spirometry

## 2021-09-16 LAB
ANION GAP SERPL CALC-SCNC: 6 MMOL/L — SIGNIFICANT CHANGE UP (ref 5–17)
BASOPHILS # BLD AUTO: 0.03 K/UL — SIGNIFICANT CHANGE UP (ref 0–0.2)
BASOPHILS NFR BLD AUTO: 0.5 % — SIGNIFICANT CHANGE UP (ref 0–2)
BUN SERPL-MCNC: 10 MG/DL — SIGNIFICANT CHANGE UP (ref 7–23)
CALCIUM SERPL-MCNC: 8.6 MG/DL — SIGNIFICANT CHANGE UP (ref 8.5–10.1)
CHLORIDE SERPL-SCNC: 106 MMOL/L — SIGNIFICANT CHANGE UP (ref 96–108)
CO2 SERPL-SCNC: 27 MMOL/L — SIGNIFICANT CHANGE UP (ref 22–31)
COVID-19 SPIKE DOMAIN AB INTERP: NEGATIVE — SIGNIFICANT CHANGE UP
COVID-19 SPIKE DOMAIN ANTIBODY RESULT: 0.4 U/ML — SIGNIFICANT CHANGE UP
CREAT SERPL-MCNC: 0.83 MG/DL — SIGNIFICANT CHANGE UP (ref 0.5–1.3)
EOSINOPHIL # BLD AUTO: 0.04 K/UL — SIGNIFICANT CHANGE UP (ref 0–0.5)
EOSINOPHIL NFR BLD AUTO: 0.6 % — SIGNIFICANT CHANGE UP (ref 0–6)
GLUCOSE SERPL-MCNC: 94 MG/DL — SIGNIFICANT CHANGE UP (ref 70–99)
HCT VFR BLD CALC: 42.6 % — SIGNIFICANT CHANGE UP (ref 39–50)
HGB BLD-MCNC: 14.3 G/DL — SIGNIFICANT CHANGE UP (ref 13–17)
IMM GRANULOCYTES NFR BLD AUTO: 0.3 % — SIGNIFICANT CHANGE UP (ref 0–1.5)
LYMPHOCYTES # BLD AUTO: 1.5 K/UL — SIGNIFICANT CHANGE UP (ref 1–3.3)
LYMPHOCYTES # BLD AUTO: 22.7 % — SIGNIFICANT CHANGE UP (ref 13–44)
MCHC RBC-ENTMCNC: 29.5 PG — SIGNIFICANT CHANGE UP (ref 27–34)
MCHC RBC-ENTMCNC: 33.6 GM/DL — SIGNIFICANT CHANGE UP (ref 32–36)
MCV RBC AUTO: 88 FL — SIGNIFICANT CHANGE UP (ref 80–100)
MONOCYTES # BLD AUTO: 0.63 K/UL — SIGNIFICANT CHANGE UP (ref 0–0.9)
MONOCYTES NFR BLD AUTO: 9.5 % — SIGNIFICANT CHANGE UP (ref 2–14)
NEUTROPHILS # BLD AUTO: 4.38 K/UL — SIGNIFICANT CHANGE UP (ref 1.8–7.4)
NEUTROPHILS NFR BLD AUTO: 66.4 % — SIGNIFICANT CHANGE UP (ref 43–77)
PLATELET # BLD AUTO: 231 K/UL — SIGNIFICANT CHANGE UP (ref 150–400)
POTASSIUM SERPL-MCNC: 3.7 MMOL/L — SIGNIFICANT CHANGE UP (ref 3.5–5.3)
POTASSIUM SERPL-SCNC: 3.7 MMOL/L — SIGNIFICANT CHANGE UP (ref 3.5–5.3)
RBC # BLD: 4.84 M/UL — SIGNIFICANT CHANGE UP (ref 4.2–5.8)
RBC # FLD: 12.4 % — SIGNIFICANT CHANGE UP (ref 10.3–14.5)
SARS-COV-2 IGG+IGM SERPL QL IA: 0.4 U/ML — SIGNIFICANT CHANGE UP
SARS-COV-2 IGG+IGM SERPL QL IA: NEGATIVE — SIGNIFICANT CHANGE UP
SODIUM SERPL-SCNC: 139 MMOL/L — SIGNIFICANT CHANGE UP (ref 135–145)
WBC # BLD: 6.6 K/UL — SIGNIFICANT CHANGE UP (ref 3.8–10.5)
WBC # FLD AUTO: 6.6 K/UL — SIGNIFICANT CHANGE UP (ref 3.8–10.5)

## 2021-09-16 RX ADMIN — SODIUM CHLORIDE 3 MILLILITER(S): 9 INJECTION INTRAMUSCULAR; INTRAVENOUS; SUBCUTANEOUS at 12:39

## 2021-09-16 RX ADMIN — CYCLOBENZAPRINE HYDROCHLORIDE 10 MILLIGRAM(S): 10 TABLET, FILM COATED ORAL at 05:27

## 2021-09-16 RX ADMIN — CELECOXIB 200 MILLIGRAM(S): 200 CAPSULE ORAL at 22:37

## 2021-09-16 RX ADMIN — HYDROMORPHONE HYDROCHLORIDE 0.5 MILLIGRAM(S): 2 INJECTION INTRAMUSCULAR; INTRAVENOUS; SUBCUTANEOUS at 05:42

## 2021-09-16 RX ADMIN — Medication 400 MILLIGRAM(S): at 12:38

## 2021-09-16 RX ADMIN — HYDROMORPHONE HYDROCHLORIDE 0.5 MILLIGRAM(S): 2 INJECTION INTRAMUSCULAR; INTRAVENOUS; SUBCUTANEOUS at 10:32

## 2021-09-16 RX ADMIN — OXYCODONE HYDROCHLORIDE 10 MILLIGRAM(S): 5 TABLET ORAL at 18:48

## 2021-09-16 RX ADMIN — OXYCODONE HYDROCHLORIDE 10 MILLIGRAM(S): 5 TABLET ORAL at 19:43

## 2021-09-16 RX ADMIN — Medication 1000 MILLIGRAM(S): at 20:30

## 2021-09-16 RX ADMIN — SODIUM CHLORIDE 3 MILLILITER(S): 9 INJECTION INTRAMUSCULAR; INTRAVENOUS; SUBCUTANEOUS at 05:19

## 2021-09-16 RX ADMIN — GABAPENTIN 300 MILLIGRAM(S): 400 CAPSULE ORAL at 12:40

## 2021-09-16 RX ADMIN — OXYCODONE HYDROCHLORIDE 10 MILLIGRAM(S): 5 TABLET ORAL at 23:07

## 2021-09-16 RX ADMIN — CELECOXIB 200 MILLIGRAM(S): 200 CAPSULE ORAL at 12:39

## 2021-09-16 RX ADMIN — SENNA PLUS 2 TABLET(S): 8.6 TABLET ORAL at 22:37

## 2021-09-16 RX ADMIN — Medication 1000 MILLIGRAM(S): at 12:53

## 2021-09-16 RX ADMIN — Medication 500 MILLIGRAM(S): at 12:39

## 2021-09-16 RX ADMIN — CELECOXIB 200 MILLIGRAM(S): 200 CAPSULE ORAL at 22:39

## 2021-09-16 RX ADMIN — Medication 400 MILLIGRAM(S): at 20:30

## 2021-09-16 RX ADMIN — SODIUM CHLORIDE 3 MILLILITER(S): 9 INJECTION INTRAMUSCULAR; INTRAVENOUS; SUBCUTANEOUS at 22:40

## 2021-09-16 RX ADMIN — Medication 100 MILLIGRAM(S): at 12:38

## 2021-09-16 RX ADMIN — GABAPENTIN 300 MILLIGRAM(S): 400 CAPSULE ORAL at 22:37

## 2021-09-16 RX ADMIN — Medication 1 MILLIGRAM(S): at 12:38

## 2021-09-16 RX ADMIN — HYDROMORPHONE HYDROCHLORIDE 0.5 MILLIGRAM(S): 2 INJECTION INTRAMUSCULAR; INTRAVENOUS; SUBCUTANEOUS at 05:27

## 2021-09-16 RX ADMIN — HYDROMORPHONE HYDROCHLORIDE 0.5 MILLIGRAM(S): 2 INJECTION INTRAMUSCULAR; INTRAVENOUS; SUBCUTANEOUS at 10:47

## 2021-09-16 RX ADMIN — Medication 1 TABLET(S): at 12:38

## 2021-09-16 RX ADMIN — Medication 500 MILLIGRAM(S): at 22:37

## 2021-09-16 RX ADMIN — CYCLOBENZAPRINE HYDROCHLORIDE 10 MILLIGRAM(S): 10 TABLET, FILM COATED ORAL at 22:36

## 2021-09-16 RX ADMIN — OXYCODONE HYDROCHLORIDE 10 MILLIGRAM(S): 5 TABLET ORAL at 22:37

## 2021-09-16 RX ADMIN — GABAPENTIN 300 MILLIGRAM(S): 400 CAPSULE ORAL at 05:27

## 2021-09-16 RX ADMIN — Medication 1 TABLET(S): at 22:36

## 2021-09-16 RX ADMIN — PANTOPRAZOLE SODIUM 40 MILLIGRAM(S): 20 TABLET, DELAYED RELEASE ORAL at 12:38

## 2021-09-16 NOTE — PROGRESS NOTE ADULT - SUBJECTIVE AND OBJECTIVE BOX
POD#1. Pt seen resting in bed. Pt has incisional site soreness. PT denies lower extremities pain, numbness, and weakness. Gan intact.    PE  Gen appearance: NAD  motor strength: 5/5 of b/l lower ext (Hf, quads, ant tibs, gastrocs, EHL)  Sensation: intact to light touch.  No calf tenderness bilat. venodynes on bilat  Incisional site: clean and dry dressing.  abd: soft. Positive bowel sounds x 4 quads    Plan  VSS, afeb  WBC: wnl, H/H: wnl  Advance diet to clears.   Discontinue gan  Mobilize with physical therapy and encouraged incentive spirometer.

## 2021-09-16 NOTE — PHYSICAL THERAPY INITIAL EVALUATION ADULT - DID THE PATIENT HAVE SURGERY?
ALIF, 1 level 15-Sep-2021 11:32:10 L5-S1 and posterior percutaneous pedicle screw instrumentation Min, Elizabeth./yes

## 2021-09-16 NOTE — PHYSICAL THERAPY INITIAL EVALUATION ADULT - LEVEL OF INDEPENDENCE: SIT/STAND, REHAB EVAL
Pt was diaphoretic and dizzy in sitting even after 3-5 min it started to increase./unable to perform

## 2021-09-16 NOTE — PROGRESS NOTE ADULT - SUBJECTIVE AND OBJECTIVE BOX
Patient seen and examined at bedside this AM. States he has some abdominal tightness that feels as if he just did 100 sit ups, and his back feels tight as well. Pain well controlled. Denies weakness, numbness or tingling. Denies headache dizziness chest pain, shortness of breath, nausea or vomiting. No acute complaints at this time. No acute events overnight. Patient states that he has passed "minimal" gas.     VITAL SIGNS:  T(C): 36.4 (09-16-21 @ 05:20), Max: 36.9 (09-15-21 @ 15:00)  HR: 69 (09-16-21 @ 05:20) (59 - 79)  BP: 132/79 (09-16-21 @ 05:20) (111/69 - 147/70)  RR: 18 (09-16-21 @ 05:20) (9 - 18)  SpO2: 100% (09-16-21 @ 05:20) (98% - 100%)      General: NAD, resting comfortably in bed  Dressing C/D/I  + DP Pulses      MOTOR EXAM:                          Elbow Flex (C5)     Wrist Ext (C6)     Elbow Ext (C7)      Finger Flex (C8)    Finger Abduction (T1)  RIGHT                 5/5                         5/5                         5/5                          5/5                              5/5  LEFT                    5/5                         5/5                         5/5                          5/5                              5/5                           Hip Flex (L2)      Knee Ext (L3)      Ank Dorsiflex (L4)     Hallux Ext (L5)     Ank PlantarFlex (S1)  RIGHT               5/5                      5/5                          5/5                            5/5                           5/5  LEFT                  5/5                      5/5                          5/5                            5/5                           5/5      SENSORY EXAM:                        C5      C6      C7      C8       T1          RIGHT          2         2        2         2         2          (0=absent, 1=impaired, 2=normal, NT=not testable)  LEFT             2         2        2         2         2          (0=absent, 1=impaired, 2=normal, NT=not testable)                        L2        L3       L4      L5       S1          RIGHT        2          2         2        2        2           (0=absent, 1=impaired, 2=normal, NT=not testable)  LEFT           2          2         2        2        2           (0=absent, 1=impaired, 2=normal, NT=not testable)    Negative Babinski bilaterally   Negative Myoclonus bilaterally      A/P:  40y m s/p ALIF L5-S1 POD 1  -Continue Ice Chips/Sips of clears with medications, until passing gas  -9/16, possibly advance to full liquid diet if passing gas  -PT/OT  -Dockery  -WBAT b/l LE  -Pain Control PRN  -No Chemical DVT ppx due to risk of epidural hematoma  -SCDs OK  -Continue perioperative abx x 24 hours  -FU AM Labs  -Incentive Spirometry

## 2021-09-16 NOTE — PHYSICAL THERAPY INITIAL EVALUATION ADULT - LIVES WITH, PROFILE
pt lives in a house w/ his wife and 2 children, has 4 KUSH and a full flight of stairs to the bedroom./spouse

## 2021-09-16 NOTE — PHYSICAL THERAPY INITIAL EVALUATION ADULT - MODALITIES TREATMENT COMMENTS
Pt able to sit at EOB but not feeling well, dizzy, pale, diaphoretic, nausea reported increased w/ time, LSO+ donned but unable to stand at this time. Returned to L side-lying w/ pillow between knees. , +IV, dressing drainage, No other drains, +SCD's B, +STEPHANIA Dockery aware.

## 2021-09-16 NOTE — PHYSICAL THERAPY INITIAL EVALUATION ADULT - GENERAL OBSERVATIONS, REHAB EVAL
Pt seen on 2N, reports pain all day, was sleeping this am when attempted to see pt and let pt rest until this 1pm session. Pt on IV pain meds, and reports 5/10 pain at this time. Noted drainage on R dressing.

## 2021-09-17 LAB
ANION GAP SERPL CALC-SCNC: 5 MMOL/L — SIGNIFICANT CHANGE UP (ref 5–17)
BUN SERPL-MCNC: 7 MG/DL — SIGNIFICANT CHANGE UP (ref 7–23)
CALCIUM SERPL-MCNC: 8.7 MG/DL — SIGNIFICANT CHANGE UP (ref 8.5–10.1)
CHLORIDE SERPL-SCNC: 105 MMOL/L — SIGNIFICANT CHANGE UP (ref 96–108)
CO2 SERPL-SCNC: 28 MMOL/L — SIGNIFICANT CHANGE UP (ref 22–31)
CREAT SERPL-MCNC: 0.84 MG/DL — SIGNIFICANT CHANGE UP (ref 0.5–1.3)
GLUCOSE SERPL-MCNC: 109 MG/DL — HIGH (ref 70–99)
HCT VFR BLD CALC: 43.6 % — SIGNIFICANT CHANGE UP (ref 39–50)
HGB BLD-MCNC: 14.7 G/DL — SIGNIFICANT CHANGE UP (ref 13–17)
MCHC RBC-ENTMCNC: 29.4 PG — SIGNIFICANT CHANGE UP (ref 27–34)
MCHC RBC-ENTMCNC: 33.7 GM/DL — SIGNIFICANT CHANGE UP (ref 32–36)
MCV RBC AUTO: 87.2 FL — SIGNIFICANT CHANGE UP (ref 80–100)
PLATELET # BLD AUTO: 224 K/UL — SIGNIFICANT CHANGE UP (ref 150–400)
POTASSIUM SERPL-MCNC: 4 MMOL/L — SIGNIFICANT CHANGE UP (ref 3.5–5.3)
POTASSIUM SERPL-SCNC: 4 MMOL/L — SIGNIFICANT CHANGE UP (ref 3.5–5.3)
RBC # BLD: 5 M/UL — SIGNIFICANT CHANGE UP (ref 4.2–5.8)
RBC # FLD: 12.2 % — SIGNIFICANT CHANGE UP (ref 10.3–14.5)
SODIUM SERPL-SCNC: 138 MMOL/L — SIGNIFICANT CHANGE UP (ref 135–145)
WBC # BLD: 7.31 K/UL — SIGNIFICANT CHANGE UP (ref 3.8–10.5)
WBC # FLD AUTO: 7.31 K/UL — SIGNIFICANT CHANGE UP (ref 3.8–10.5)

## 2021-09-17 RX ADMIN — CYCLOBENZAPRINE HYDROCHLORIDE 10 MILLIGRAM(S): 10 TABLET, FILM COATED ORAL at 13:54

## 2021-09-17 RX ADMIN — CELECOXIB 200 MILLIGRAM(S): 200 CAPSULE ORAL at 10:17

## 2021-09-17 RX ADMIN — SODIUM CHLORIDE 3 MILLILITER(S): 9 INJECTION INTRAMUSCULAR; INTRAVENOUS; SUBCUTANEOUS at 13:54

## 2021-09-17 RX ADMIN — GABAPENTIN 300 MILLIGRAM(S): 400 CAPSULE ORAL at 05:27

## 2021-09-17 RX ADMIN — OXYCODONE HYDROCHLORIDE 10 MILLIGRAM(S): 5 TABLET ORAL at 05:57

## 2021-09-17 RX ADMIN — OXYCODONE HYDROCHLORIDE 10 MILLIGRAM(S): 5 TABLET ORAL at 20:04

## 2021-09-17 RX ADMIN — CELECOXIB 200 MILLIGRAM(S): 200 CAPSULE ORAL at 21:30

## 2021-09-17 RX ADMIN — Medication 1 TABLET(S): at 13:54

## 2021-09-17 RX ADMIN — Medication 1 TABLET(S): at 10:17

## 2021-09-17 RX ADMIN — CYCLOBENZAPRINE HYDROCHLORIDE 10 MILLIGRAM(S): 10 TABLET, FILM COATED ORAL at 21:29

## 2021-09-17 RX ADMIN — Medication 1 MILLIGRAM(S): at 10:17

## 2021-09-17 RX ADMIN — CYCLOBENZAPRINE HYDROCHLORIDE 10 MILLIGRAM(S): 10 TABLET, FILM COATED ORAL at 05:28

## 2021-09-17 RX ADMIN — Medication 100 MILLIGRAM(S): at 10:17

## 2021-09-17 RX ADMIN — PANTOPRAZOLE SODIUM 40 MILLIGRAM(S): 20 TABLET, DELAYED RELEASE ORAL at 10:17

## 2021-09-17 RX ADMIN — Medication 1 TABLET(S): at 05:27

## 2021-09-17 RX ADMIN — SODIUM CHLORIDE 3 MILLILITER(S): 9 INJECTION INTRAMUSCULAR; INTRAVENOUS; SUBCUTANEOUS at 21:30

## 2021-09-17 RX ADMIN — Medication 500 MILLIGRAM(S): at 21:30

## 2021-09-17 RX ADMIN — CELECOXIB 200 MILLIGRAM(S): 200 CAPSULE ORAL at 10:18

## 2021-09-17 RX ADMIN — GABAPENTIN 300 MILLIGRAM(S): 400 CAPSULE ORAL at 21:30

## 2021-09-17 RX ADMIN — OXYCODONE HYDROCHLORIDE 10 MILLIGRAM(S): 5 TABLET ORAL at 05:27

## 2021-09-17 RX ADMIN — GABAPENTIN 300 MILLIGRAM(S): 400 CAPSULE ORAL at 13:54

## 2021-09-17 RX ADMIN — SODIUM CHLORIDE 3 MILLILITER(S): 9 INJECTION INTRAMUSCULAR; INTRAVENOUS; SUBCUTANEOUS at 05:28

## 2021-09-17 RX ADMIN — Medication 1 TABLET(S): at 21:29

## 2021-09-17 RX ADMIN — SENNA PLUS 2 TABLET(S): 8.6 TABLET ORAL at 21:29

## 2021-09-17 RX ADMIN — OXYCODONE HYDROCHLORIDE 10 MILLIGRAM(S): 5 TABLET ORAL at 20:34

## 2021-09-17 RX ADMIN — Medication 500 MILLIGRAM(S): at 10:17

## 2021-09-17 NOTE — PROGRESS NOTE ADULT - SUBJECTIVE AND OBJECTIVE BOX
Subjective: POD#2    Objective: GIANNI    Heent: N/C, AT PER no scleral icterus, No JVD  Pul: clear  Cor: RRR  Abdomen: soft, normal BS. Wound - clean  Extremities: without edema, motor intact, good distal pulses    09-16    139  |  106  |  10  ----------------------------<  94  3.7   |  27  |  0.83    Ca    8.6      16 Sep 2021 06:26                              14.7   7.31  )-----------( 224      ( 17 Sep 2021 06:33 )             43.6

## 2021-09-17 NOTE — PROGRESS NOTE ADULT - SUBJECTIVE AND OBJECTIVE BOX
[facility]                                                           Orthopedic Spine Progress Note      POST OPERATIVE DAY #: 2   STATUS POST: L5-S1 ALIF w/posterior percutaneous screws                Pre-Op Dx: Spondylosis of lumbar spine      Post-Op Dx:  Spondylosis of lumbar spine      SUBJECTIVE: Patient seen and examined at 0830 this am, pain controlled, sat at edge of bed w/PT yesterday but unable to tolerate OOB to chair or ambulating due to lightheadedness, feels better this am, +flatus, seen earlier by Dr. Llamas      Current Pain Management:  [ ] PCA   [ ] Po Analgesics [ ] IM /IV Anagesics     Vital Signs Last 24 Hrs  T(C): 36.7 (17 Sep 2021 08:46), Max: 37.1 (17 Sep 2021 05:15)  T(F): 98.1 (17 Sep 2021 08:46), Max: 98.7 (17 Sep 2021 05:15)  HR: 80 (17 Sep 2021 08:46) (72 - 91)  BP: 125/75 (17 Sep 2021 08:46) (114/54 - 133/79)  BP(mean): --  RR: 18 (17 Sep 2021 08:46) (18 - 18)  SpO2: 97% (17 Sep 2021 08:46) (96% - 100%)  I&O's Detail    16 Sep 2021 07:01  -  17 Sep 2021 07:00  --------------------------------------------------------  IN:    IV PiggyBack: 100 mL    Lactated Ringers: 900 mL  Total IN: 1000 mL    OUT:    Indwelling Catheter - Urethral (mL): 3100 mL    Voided (mL): 1200 mL  Total OUT: 4300 mL    Total NET: -3300 mL      17 Sep 2021 07:01  -  17 Sep 2021 09:46  --------------------------------------------------------  IN:  Total IN: 0 mL    OUT:    Voided (mL): 500 mL  Total OUT: 500 mL    Total NET: -500 mL          OBJECTIVE:         Wound /Dressing:   Cervical ROM:  Lumbar: ROM :  Neurological: A/O x               Sensation: [ ] intact to light touch  [ ] decreased:          Motor exam: [  ]          [ ] Upper extremity    Delt      Bicp       Tri      Wrist ext  Wrst Flex       Digit Ext Digit Flex                                     R         5/5        5/5        5/5       5/5            5/5            5/5       5/5          5/5                                     L          5/5        5/5        5/5       5/5            5/5            5/5       5/5          5/5         [ ] Lower ext.     Hip Flx  Hip Ext   Hip Abd  Hip Add Quad   Hamstrg   TA       EHL      GS                              R        5/5        5/5        5/5             5/5        5/5        5/5        5/5     5/5      5/5                              L         5/5        5/5        5/5             5/5        5/5        5/5        5/5     5/5      5/5                                                        [ ] Vascular :             Tension Signs:           Long Tract Findings:     RADIOLOGY & ADDITIONAL STUDIES:                                               LABS:                        14.7   7.31  )-----------( 224      ( 17 Sep 2021 06:33 )             43.6     09-17    138  |  105  |  7   ----------------------------<  109<H>  4.0   |  28  |  0.84    Ca    8.7      17 Sep 2021 06:33          Blood Culture:   Wound Culture:                                                                      [facility]                                                           Orthopedic Spine Progress Note      POST OPERATIVE DAY #: 2   STATUS POST: L5-S1 ALIF w/posterior percutaneous screws                Pre-Op Dx: Spondylosis of lumbar spine      Post-Op Dx:  Spondylosis of lumbar spine      SUBJECTIVE: Patient seen and examined at 0830 this am, pain controlled, sat at edge of bed w/PT yesterday but unable to tolerate OOB to chair or ambulating due to lightheadedness, feels better this am, +flatus, seen earlier by Dr. Llamas      Current Pain Management:  [ ] PCA   [x] Po Analgesics [ ] IM /IV Anagesics     Vital Signs Last 24 Hrs  T(C): 36.7 (17 Sep 2021 08:46), Max: 37.1 (17 Sep 2021 05:15)  T(F): 98.1 (17 Sep 2021 08:46), Max: 98.7 (17 Sep 2021 05:15)  HR: 80 (17 Sep 2021 08:46) (72 - 91)  BP: 125/75 (17 Sep 2021 08:46) (114/54 - 133/79)  BP(mean): --  RR: 18 (17 Sep 2021 08:46) (18 - 18)  SpO2: 97% (17 Sep 2021 08:46) (96% - 100%)  I&O's Detail    16 Sep 2021 07:01  -  17 Sep 2021 07:00  --------------------------------------------------------  IN:    IV PiggyBack: 100 mL    Lactated Ringers: 900 mL  Total IN: 1000 mL    OUT:    Indwelling Catheter - Urethral (mL): 3100 mL    Voided (mL): 1200 mL  Total OUT: 4300 mL    Total NET: -3300 mL      17 Sep 2021 07:01  -  17 Sep 2021 09:46  --------------------------------------------------------  IN:  Total IN: 0 mL    OUT:    Voided (mL): 500 mL  Total OUT: 500 mL    Total NET: -500 mL          OBJECTIVE:       Wound /Dressing: dressings intact  Lumbar ROM: not tested  Neurological: A/O x 3              Sensation: [x] intact to light touch  [ ] decreased:          Motor exam: [x]            [x] Lower ext.       Hip Flx     Quad   Hamstrg        TA        EHL        GS                              R        5/5        5/5        5/5             5/5        5/5        5/5                                  L         5/5        5/5        5/5             5/5        5/5        5/5                                                             [x] Vascular: intact           Tension Signs: none          Long Tract Findings: none                                                   LABS:                        14.7   7.31  )-----------( 224      ( 17 Sep 2021 06:33 )             43.6     09-17    138  |  105  |  7   ----------------------------<  109<H>  4.0   |  28  |  0.84    Ca    8.7      17 Sep 2021 06:33

## 2021-09-17 NOTE — PROGRESS NOTE ADULT - SUBJECTIVE AND OBJECTIVE BOX
Patient seen and examined at bedside this AM. States his dizziness has improved since yesterday now that he is eating well. Pain well controlled. Denies weakness, numbness or tingling. Denies headache dizziness chest pain, shortness of breath, nausea or vomiting. No acute complaints at this time. No acute events overnight. Patient states that he has passed "minimal" gas.     Vital Signs Last 24 Hrs  T(C): 37.1 (17 Sep 2021 05:15), Max: 37.1 (17 Sep 2021 05:15)  T(F): 98.7 (17 Sep 2021 05:15), Max: 98.7 (17 Sep 2021 05:15)  HR: 76 (17 Sep 2021 05:15) (71 - 91)  BP: 114/54 (17 Sep 2021 05:15) (114/54 - 133/79)  BP(mean): --  RR: 18 (17 Sep 2021 05:15) (16 - 18)  SpO2: 96% (17 Sep 2021 05:15) (96% - 100%)      MOTOR EXAM:                          Elbow Flex (C5)     Wrist Ext (C6)     Elbow Ext (C7)      Finger Flex (C8)    Finger Abduction (T1)  RIGHT                 5/5                         5/5                         5/5                          5/5                              5/5  LEFT                    5/5                         5/5                         5/5                          5/5                              5/5                           Hip Flex (L2)      Knee Ext (L3)      Ank Dorsiflex (L4)     Hallux Ext (L5)     Ank PlantarFlex (S1)  RIGHT               5/5                      5/5                          5/5                            5/5                           5/5  LEFT                  5/5                      5/5                          5/5                            5/5                           5/5      SENSORY EXAM:                        C5      C6      C7      C8       T1          RIGHT          2         2        2         2         2          (0=absent, 1=impaired, 2=normal, NT=not testable)  LEFT             2         2        2         2         2          (0=absent, 1=impaired, 2=normal, NT=not testable)                        L2        L3       L4      L5       S1          RIGHT        2          2         2        2        2           (0=absent, 1=impaired, 2=normal, NT=not testable)  LEFT           2          2         2        2        2           (0=absent, 1=impaired, 2=normal, NT=not testable)    Negative Babinski bilaterally   Negative Myoclonus bilaterally      A/P:  40y m s/p ALIF L5-S1 POD 2  -Continue Ice Chips/Sips of clears with medications, until passing gas  -9/16, possibly advance to full liquid diet if passing gas  -PT/OT  -WBAT b/l LE  -Pain Control PRN  -No Chemical DVT ppx due to risk of epidural hematoma  -SCDs OK  -Continue perioperative abx x 24 hours  -FU AM Labs  -Incentive Spirometry Patient seen and examined at bedside this AM. States his dizziness has improved since yesterday now that he is eating a full liquid diet. Pain well controlled. Denies weakness, numbness or tingling. Denies headache dizziness chest pain, shortness of breath, nausea or vomiting. No acute complaints at this time. No acute events overnight. Patient states that he has been passing gas. No abdominal discomfort.    Vital Signs Last 24 Hrs  T(C): 37.1 (17 Sep 2021 05:15), Max: 37.1 (17 Sep 2021 05:15)  T(F): 98.7 (17 Sep 2021 05:15), Max: 98.7 (17 Sep 2021 05:15)  HR: 76 (17 Sep 2021 05:15) (71 - 91)  BP: 114/54 (17 Sep 2021 05:15) (114/54 - 133/79)  BP(mean): --  RR: 18 (17 Sep 2021 05:15) (16 - 18)  SpO2: 96% (17 Sep 2021 05:15) (96% - 100%)      MOTOR EXAM:                          Elbow Flex (C5)     Wrist Ext (C6)     Elbow Ext (C7)      Finger Flex (C8)    Finger Abduction (T1)  RIGHT                 5/5                         5/5                         5/5                          5/5                              5/5  LEFT                    5/5                         5/5                         5/5                          5/5                              5/5                           Hip Flex (L2)      Knee Ext (L3)      Ank Dorsiflex (L4)     Hallux Ext (L5)     Ank PlantarFlex (S1)  RIGHT               5/5                      5/5                          5/5                            5/5                           5/5  LEFT                  5/5                      5/5                          5/5                            5/5                           5/5      SENSORY EXAM:                        C5      C6      C7      C8       T1          RIGHT          2         2        2         2         2          (0=absent, 1=impaired, 2=normal, NT=not testable)  LEFT             2         2        2         2         2          (0=absent, 1=impaired, 2=normal, NT=not testable)                        L2        L3       L4      L5       S1          RIGHT        2          2         2        2        2           (0=absent, 1=impaired, 2=normal, NT=not testable)  LEFT           2          2         2        2        2           (0=absent, 1=impaired, 2=normal, NT=not testable)    Negative Babinski bilaterally   Negative Myoclonus bilaterally      A/P:  40y m s/p ALIF L5-S1 POD 2  -Continue full liquid diet, tolerating well, may advance today  -PT/OT  -Sarkis MONCADA'd  -WBAT b/l LE  -Pain Control PRN  -No Chemical DVT ppx due to risk of epidural hematoma  -SCDs OK  -Continue perioperative abx x 24 hours  -FU AM Labs  -Incentive Spirometry

## 2021-09-18 ENCOUNTER — TRANSCRIPTION ENCOUNTER (OUTPATIENT)
Age: 41
End: 2021-09-18

## 2021-09-18 VITALS
HEART RATE: 79 BPM | OXYGEN SATURATION: 98 % | SYSTOLIC BLOOD PRESSURE: 132 MMHG | TEMPERATURE: 99 F | DIASTOLIC BLOOD PRESSURE: 59 MMHG | RESPIRATION RATE: 18 BRPM

## 2021-09-18 LAB
ANION GAP SERPL CALC-SCNC: 5 MMOL/L — SIGNIFICANT CHANGE UP (ref 5–17)
BUN SERPL-MCNC: 11 MG/DL — SIGNIFICANT CHANGE UP (ref 7–23)
CALCIUM SERPL-MCNC: 9.4 MG/DL — SIGNIFICANT CHANGE UP (ref 8.5–10.1)
CHLORIDE SERPL-SCNC: 103 MMOL/L — SIGNIFICANT CHANGE UP (ref 96–108)
CO2 SERPL-SCNC: 30 MMOL/L — SIGNIFICANT CHANGE UP (ref 22–31)
CREAT SERPL-MCNC: 0.94 MG/DL — SIGNIFICANT CHANGE UP (ref 0.5–1.3)
GLUCOSE SERPL-MCNC: 97 MG/DL — SIGNIFICANT CHANGE UP (ref 70–99)
HCT VFR BLD CALC: 44.5 % — SIGNIFICANT CHANGE UP (ref 39–50)
HGB BLD-MCNC: 14.8 G/DL — SIGNIFICANT CHANGE UP (ref 13–17)
MCHC RBC-ENTMCNC: 29.5 PG — SIGNIFICANT CHANGE UP (ref 27–34)
MCHC RBC-ENTMCNC: 33.3 GM/DL — SIGNIFICANT CHANGE UP (ref 32–36)
MCV RBC AUTO: 88.6 FL — SIGNIFICANT CHANGE UP (ref 80–100)
PLATELET # BLD AUTO: 262 K/UL — SIGNIFICANT CHANGE UP (ref 150–400)
POTASSIUM SERPL-MCNC: 4.2 MMOL/L — SIGNIFICANT CHANGE UP (ref 3.5–5.3)
POTASSIUM SERPL-SCNC: 4.2 MMOL/L — SIGNIFICANT CHANGE UP (ref 3.5–5.3)
RBC # BLD: 5.02 M/UL — SIGNIFICANT CHANGE UP (ref 4.2–5.8)
RBC # FLD: 12.2 % — SIGNIFICANT CHANGE UP (ref 10.3–14.5)
SODIUM SERPL-SCNC: 138 MMOL/L — SIGNIFICANT CHANGE UP (ref 135–145)
WBC # BLD: 7.46 K/UL — SIGNIFICANT CHANGE UP (ref 3.8–10.5)
WBC # FLD AUTO: 7.46 K/UL — SIGNIFICANT CHANGE UP (ref 3.8–10.5)

## 2021-09-18 RX ORDER — GABAPENTIN 400 MG/1
1 CAPSULE ORAL
Qty: 0 | Refills: 0 | DISCHARGE
Start: 2021-09-18

## 2021-09-18 RX ORDER — CYCLOBENZAPRINE HYDROCHLORIDE 10 MG/1
1 TABLET, FILM COATED ORAL
Qty: 0 | Refills: 0 | DISCHARGE
Start: 2021-09-18

## 2021-09-18 RX ORDER — OMEPRAZOLE 10 MG/1
1 CAPSULE, DELAYED RELEASE ORAL
Qty: 0 | Refills: 0 | DISCHARGE

## 2021-09-18 RX ADMIN — Medication 100 MILLIGRAM(S): at 10:33

## 2021-09-18 RX ADMIN — OXYCODONE HYDROCHLORIDE 5 MILLIGRAM(S): 5 TABLET ORAL at 05:25

## 2021-09-18 RX ADMIN — CELECOXIB 200 MILLIGRAM(S): 200 CAPSULE ORAL at 10:32

## 2021-09-18 RX ADMIN — OXYCODONE HYDROCHLORIDE 5 MILLIGRAM(S): 5 TABLET ORAL at 05:55

## 2021-09-18 RX ADMIN — Medication 500 MILLIGRAM(S): at 10:33

## 2021-09-18 RX ADMIN — GABAPENTIN 300 MILLIGRAM(S): 400 CAPSULE ORAL at 05:23

## 2021-09-18 RX ADMIN — Medication 1 TABLET(S): at 05:23

## 2021-09-18 RX ADMIN — SODIUM CHLORIDE 3 MILLILITER(S): 9 INJECTION INTRAMUSCULAR; INTRAVENOUS; SUBCUTANEOUS at 05:22

## 2021-09-18 RX ADMIN — SODIUM CHLORIDE 3 MILLILITER(S): 9 INJECTION INTRAMUSCULAR; INTRAVENOUS; SUBCUTANEOUS at 13:08

## 2021-09-18 RX ADMIN — CYCLOBENZAPRINE HYDROCHLORIDE 10 MILLIGRAM(S): 10 TABLET, FILM COATED ORAL at 05:23

## 2021-09-18 RX ADMIN — PANTOPRAZOLE SODIUM 40 MILLIGRAM(S): 20 TABLET, DELAYED RELEASE ORAL at 10:33

## 2021-09-18 RX ADMIN — OXYCODONE HYDROCHLORIDE 10 MILLIGRAM(S): 5 TABLET ORAL at 11:33

## 2021-09-18 RX ADMIN — OXYCODONE HYDROCHLORIDE 10 MILLIGRAM(S): 5 TABLET ORAL at 10:33

## 2021-09-18 RX ADMIN — Medication 1 MILLIGRAM(S): at 10:33

## 2021-09-18 RX ADMIN — Medication 1 TABLET(S): at 10:33

## 2021-09-18 NOTE — DISCHARGE NOTE PROVIDER - CARE PROVIDER_API CALL
Miracle Li)  Orthopaedic Surgery  [address] [place] [address], [address] [place] [address]  Phone: [phone]  Fax: [phone]  Follow Up Time: 1 week

## 2021-09-18 NOTE — PROGRESS NOTE ADULT - ASSESSMENT
A/P: 40y m s/p ALIF L5-S1 POD #3    -Pain Control PRN  -Continue full liquid diet, tolerating well, may continue to advance   -PT  -Sarkis DC'd  -WBAT b/l LE  -No Chemical DVT ppx due to risk of epidural hematoma  -SCDs OK  -Periop abx completed  -FU AM Labs  -Incentive Spirometry
OOB with PT. Doing well. Recall as needed.
A/P: s/p L5-S1 ALIF w/posterior percutaneous screws - stable  1. PT/mobilization  2. advance diet as tolerated

## 2021-09-18 NOTE — DISCHARGE NOTE NURSING/CASE MANAGEMENT/SOCIAL WORK - PATIENT PORTAL LINK FT
You can access the FollowMyHealth Patient Portal offered by [facility] by registering at the following website: [email or web address]. By joining [facility]’s FollowMyHealth portal, you will also be able to view your health information using other applications (apps) compatible with our system.

## 2021-09-18 NOTE — PROGRESS NOTE ADULT - SUBJECTIVE AND OBJECTIVE BOX
Patient seen and examined at bedside, resting comfortably. Pain well controlled. Denies headache/dizziness/lightheadedness, chest pain, dyspnea, n/v, numbness/tingling. No complaints at this time. No overnight events.    LABS:                        14.7   7.31  )-----------( 224      ( 17 Sep 2021 06:33 )             43.6     09-17    138  |  105  |  7   ----------------------------<  109<H>  4.0   |  28  |  0.84    Ca    8.7      17 Sep 2021 06:33            VITAL SIGNS:  T(C): 37.2 (09-18-21 @ 00:51), Max: 37.2 (09-18-21 @ 00:51)  HR: 73 (09-18-21 @ 00:51) (73 - 88)  BP: 102/57 (09-18-21 @ 00:51) (102/57 - 125/75)  RR: 18 (09-18-21 @ 00:51) (18 - 18)  SpO2: 98% (09-18-21 @ 00:51) (97% - 98%)          MOTOR EXAM:                          Elbow Flex (C5)     Wrist Ext (C6)     Elbow Ext (C7)      Finger Flex (C8)    Finger Abduction (T1)  RIGHT                 5/5                         5/5                         5/5                          5/5                              5/5  LEFT                    5/5                         5/5                         5/5                          5/5                              5/5                           Hip Flex (L2)      Knee Ext (L3)      Ank Dorsiflex (L4)     Hallux Ext (L5)     Ank PlantarFlex (S1)  RIGHT               5/5                      5/5                          5/5                            5/5                           5/5  LEFT                  5/5                      5/5                          5/5                            5/5                           5/5      SENSORY EXAM:                        C5      C6      C7      C8       T1          RIGHT          2         2        2         2         2          (0=absent, 1=impaired, 2=normal, NT=not testable)  LEFT             2         2        2         2         2          (0=absent, 1=impaired, 2=normal, NT=not testable)                        L2        L3       L4      L5       S1          RIGHT        2          2         2        2        2           (0=absent, 1=impaired, 2=normal, NT=not testable)  LEFT           2          2         2        2        2           (0=absent, 1=impaired, 2=normal, NT=not testable)      Negative Babinski bilaterally   Negative Myoclonus bilaterally  Negative Hoffmans       Bi-Rhombic Flap Text: The defect edges were debeveled with a #15 scalpel blade.  Given the location of the defect and the proximity to free margins a bi-rhombic flap was deemed most appropriate.  Using a sterile surgical marker, an appropriate rhombic flap was drawn incorporating the defect. The area thus outlined was incised deep to adipose tissue with a #15 scalpel blade.  The skin margins were undermined to an appropriate distance in all directions utilizing iris scissors.

## 2021-09-18 NOTE — PROGRESS NOTE ADULT - PROVIDER SPECIALTY LIST ADULT
Orthopedics
Surgery

## 2021-09-18 NOTE — PROGRESS NOTE ADULT - SUBJECTIVE AND OBJECTIVE BOX
POD#3. Pt seen resting on chair. PT ambulated with physical therapy and was able to perform stairs without complications. Pt denies lower extremities pain, numbness, and weakness. Voided on own without complications. Pass flatus.     PE  Gen appearance: NAD  motor strength: 5/5 of b/l lower ext (HF, quads, ant tibs, gastrocs, EHL)  Sensation: intact to light touch  No calf tenderness bilat  Incisional sites: clean and dry dressing of the anterior dressing. Posterior dressing dry serosang drainage on dressing (changed).    Plan  VSS, afeb  WBC: wnl, H/H: wnl  Pt has post-op meds at home  Stable for DC to home.  POD#3. Pt seen resting on chair. PT ambulated with physical therapy and was able to perform stairs without complications. Pt denies lower extremities pain, numbness, and weakness. Voided on own without complications. Pass flatus. tolerated regular diet without complications.     PE  Gen appearance: NAD  motor strength: 5/5 of b/l lower ext (HF, quads, ant tibs, gastrocs, EHL)  Sensation: intact to light touch  No calf tenderness bilat  Incisional sites: clean and dry dressing of the anterior dressing. Posterior dressing dry serosang drainage on dressing (changed).  Abd: soft. positive bowel sounds x 4 quads.       Plan  VSS, afeb  WBC: wnl, H/H: wnl  Pt has post-op meds at home  Stable for DC to home.

## 2021-09-18 NOTE — DISCHARGE NOTE PROVIDER - NSDCMRMEDTOKEN_GEN_ALL_CORE_FT
allopurinol 100 mg oral tablet: 1 tab(s) orally once a day  cyclobenzaprine 10 mg oral tablet: 1 tab(s) orally every 8 hours  gabapentin 300 mg oral capsule: 1 cap(s) orally every 8 hours

## 2021-09-18 NOTE — DISCHARGE NOTE PROVIDER - HOSPITAL COURSE
9/15/21: L5-S1 ALIF, L5-S1 screws    9/16/21: POD#1  Pt seen resting in bed. Pt has incisional site soreness. PT denies lower extremities pain, numbness, and weakness. Gan intact.  Gen appearance: NAD  motor strength: 5/5 of b/l lower ext (Hf, quads, ant tibs, gastrocs, EHL)  Sensation: intact to light touch.  No calf tenderness bilat. venodynes on bilat  Incisional site: clean and dry dressing.  abd: soft. Positive bowel sounds x 4 quads  VSS, afeb  WBC: wnl, H/H: wnl  Advance diet to clears.   Discontinue gan  Mobilize with physical therapy and encouraged incentive spirometer.    9/17/21: POD#2  was only able to tolerate sitting on edge of bed ,VSS, afeb, WBC 7.31, H/H 14.7/43.6, neuro/motor intact, +flatus, voiding, on full liquids now, dressings intact, PT/mob today    9/18/21: POD#3. Pt seen resting on chair. PT ambulated with physical therapy and was able to perform stairs without complications. Pt denies lower extremities pain, numbness, and weakness. Voided on own without complications. Pass flatus. Tolerated regular diet without complications.     PE  Gen appearance: NAD  motor strength: 5/5 of b/l lower ext (HF, quads, ant tibs, gastrocs, EHL)  Sensation: intact to light touch  No calf tenderness bilat  Incisional sites: clean and dry dressing of the anterior dressing. Posterior dressing dry serosang drainage on dressing (changed).  Abd: soft. positive bowel sounds x 4 quads.     Plan  VSS, afeb  WBC: wnl, H/H: wnl  Pt has post-op meds at home  Stable for DC to home.

## 2021-09-20 DIAGNOSIS — M43.17 SPONDYLOLISTHESIS, LUMBOSACRAL REGION: ICD-10-CM

## 2021-09-20 DIAGNOSIS — M47.817 SPONDYLOSIS WITHOUT MYELOPATHY OR RADICULOPATHY, LUMBOSACRAL REGION: ICD-10-CM

## 2021-09-20 DIAGNOSIS — M51.17 INTERVERTEBRAL DISC DISORDERS WITH RADICULOPATHY, LUMBOSACRAL REGION: ICD-10-CM

## 2022-07-01 PROBLEM — M54.5 LOW BACK PAIN: Chronic | Status: ACTIVE | Noted: 2021-09-07

## 2022-07-01 PROBLEM — M43.16 SPONDYLOLISTHESIS, LUMBAR REGION: Chronic | Status: ACTIVE | Noted: 2021-09-07

## 2022-07-01 PROBLEM — M10.9 GOUT, UNSPECIFIED: Chronic | Status: ACTIVE | Noted: 2021-09-07

## 2022-07-01 PROBLEM — M51.26 OTHER INTERVERTEBRAL DISC DISPLACEMENT, LUMBAR REGION: Chronic | Status: ACTIVE | Noted: 2021-09-07

## 2022-07-01 PROBLEM — K21.9 GASTRO-ESOPHAGEAL REFLUX DISEASE WITHOUT ESOPHAGITIS: Chronic | Status: ACTIVE | Noted: 2021-09-07

## 2022-07-07 ENCOUNTER — NON-APPOINTMENT (OUTPATIENT)
Age: 42
End: 2022-07-07

## 2022-07-12 ENCOUNTER — APPOINTMENT (OUTPATIENT)
Dept: UROLOGY | Facility: CLINIC | Age: 42
End: 2022-07-12

## 2022-07-12 VITALS
OXYGEN SATURATION: 99 % | BODY MASS INDEX: 27.77 KG/M2 | DIASTOLIC BLOOD PRESSURE: 87 MMHG | WEIGHT: 205 LBS | HEART RATE: 58 BPM | RESPIRATION RATE: 15 BRPM | HEIGHT: 72 IN | SYSTOLIC BLOOD PRESSURE: 143 MMHG | TEMPERATURE: 98.2 F

## 2022-07-12 DIAGNOSIS — N50.3 CYST OF EPIDIDYMIS: ICD-10-CM

## 2022-07-12 DIAGNOSIS — N45.1 EPIDIDYMITIS: ICD-10-CM

## 2022-07-12 DIAGNOSIS — Z98.52 VASECTOMY STATUS: ICD-10-CM

## 2022-07-12 DIAGNOSIS — N53.14 RETROGRADE EJACULATION: ICD-10-CM

## 2022-07-12 PROCEDURE — 99204 OFFICE O/P NEW MOD 45 MIN: CPT

## 2022-07-12 NOTE — ASSESSMENT
[FreeTextEntry1] : S/p vasectomy last year \par - with chronic pain after \par - developed cyst in epididymis \par \par Patient had drainage of epididymal cyst which relieves his pain for 5 days.\par \par This was performed by urologist in [facility].\par \par However the cyst in the epididymis have returned and increase in size.  The original pathology from the cyst showed sperm consistent with spermatocele.\par \par I agree with approach of first removing the cyst in the operating room as this may completely resolve his pain.  The reason that the cyst returned is because of the spermatocele and it was only drained.\par \par The fact that he achieved release of the relief of the pain after drainage makes me convinced that he should have complete resolution of his pain after cyst removal.\par \par This was a second opinion consultation.  I agree with the primary urologist plan.\par \par He will return to his primary urologist for further care.  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  His procedure was in September.\par \par I do not believe that there is any association between his cyst or pain in the right epididymis and the retrograde ejaculation from the spinal fusion.\par \par Retrograde ejaculation after spinal fusion is not uncommon or unheard of after L5-S1 spinal fusion.\par \par It may take 2 to 3 years for the retrograde ejaculation.  He lives but the fact that he noticed some ejaculate already is a good prognostic factor.\par \par total time 41 min\par

## 2022-07-12 NOTE — HISTORY OF PRESENT ILLNESS
[FreeTextEntry1] : Had vasectomy in May 2021\par since vasectomy had pain \par \par right testis \par dull aching pain \par \par has disk disease \par had fusion l5-S1 \par retrograde ejaculation \par since last year \par \par had cyst on the right side \par US \par \par pain was intermittent in the right testis \par \par This year had bilateral orchiopexy yg possibility of sub torsion\par \par cyst removed on the right side \par both done in April 2022\par \par had spermatocele - drained in office recently \par \par had pain constantly 4/10\par \par had antibiotic \par didn’t make any difference \par \par \par \par

## 2022-07-12 NOTE — PHYSICAL EXAM
[Urethral Meatus] : meatus normal [Penis Abnormality] : normal circumcised penis [FreeTextEntry1] : tender right epididymal cyst approximately 5 to 7 mm

## 2022-08-16 ENCOUNTER — APPOINTMENT (OUTPATIENT)
Dept: UROLOGY | Facility: CLINIC | Age: 42
End: 2022-08-16

## 2023-05-26 ENCOUNTER — OFFICE (OUTPATIENT)
Dept: URBAN - METROPOLITAN AREA CLINIC 6 | Facility: CLINIC | Age: 43
Setting detail: OPHTHALMOLOGY
End: 2023-05-26
Payer: COMMERCIAL

## 2023-05-26 DIAGNOSIS — H00.15: ICD-10-CM

## 2023-05-26 PROCEDURE — 99203 OFFICE O/P NEW LOW 30 MIN: CPT | Performed by: OPHTHALMOLOGY

## 2023-05-26 ASSESSMENT — CONFRONTATIONAL VISUAL FIELD TEST (CVF)
OD_FINDINGS: FULL
OS_FINDINGS: FULL

## 2023-05-26 ASSESSMENT — VISUAL ACUITY
OD_BCVA: 20/20-1
OS_BCVA: 20/20

## 2023-06-22 NOTE — H&P PST ADULT - ANESTHESIA, PREVIOUS REACTION, PROFILE
"Care Management Follow Up    Length of Stay (days): 3    Expected Discharge Date: 06/22/2023     Concerns to be Addressed: all concerns addressed in this encounter     Patient plan of care discussed at interdisciplinary rounds: Yes    Anticipated Discharge Disposition: Skilled Nursing Facility, Transitional Care     Anticipated Discharge Services: None  Anticipated Discharge DME: None    Patient/family educated on Medicare website which has current facility and service quality ratings: yes  Education Provided on the Discharge Plan:    Patient/Family in Agreement with the Plan: yes    Referrals Placed by CM/SW:    Private pay costs discussed: Not applicable    Additional Information:  Writer received a call from [facility] with concern regarding a delay discharge due to [facility] Calling patient needs a face to face assessment prior to being discharged. Writer called Shelia [phone] with Pre admission screening with [facility]. Shelia stated that patient trigged again for not meeting with level of needs for skilled nursing facility stay with MA payment. Shelia explained she is waiting to hear from her manager and also called patient's [facility]  who is would be the proper avenue to complete assessment. Writer explained that patient is typically independent at baseline and came in for a right hip revision and is below baseline needing assist of one and has a skilled need. Shelia agreed but could not explain why this triggered. Writer asked if this can be resolved as patient is ready for discharge and has skilled need. Shelia was not able to explain this and was going to call a counterpart at [facility] can explain this.    Call received fro Shelia. She explained that the ADL's were not what triggered the needs assessment but patient's birthdate that was entered as it appeared a \"6 month old was needing rehab\". Shelia explained the the  has authority to edit the Pas and " she left a  for her to complete the change. Shelia explained that once this change is completed hopefull that this will happen by tomorrow, patient could then discharge. Shelia explained once they receive the updated PAS with patient's birthdate from the care coordinator with [facility], [place] with [facility] preadmission screening will call floor  confirming a discharge can discharge.     Call received from Shelia, She stated that trent Bone calls and and writer doesn't hear from her, the  for [facility] for patient can be reached at [phone] option 1. Once the birthday has been updated and senior linkage line gets this, patient will be able to discharge to accepted TCU facility. Floor social work updated.     ASAD Angeles, SW   Social Work   [facility]   none

## 2023-08-21 ENCOUNTER — RX ONLY (RX ONLY)
Age: 43
End: 2023-08-21

## 2023-08-21 ENCOUNTER — OFFICE (OUTPATIENT)
Dept: URBAN - METROPOLITAN AREA CLINIC 115 | Facility: CLINIC | Age: 43
Setting detail: OPHTHALMOLOGY
End: 2023-08-21
Payer: COMMERCIAL

## 2023-08-21 DIAGNOSIS — H00.11: ICD-10-CM

## 2023-08-21 PROCEDURE — 92014 COMPRE OPH EXAM EST PT 1/>: CPT | Performed by: OPHTHALMOLOGY

## 2023-08-21 ASSESSMENT — CONFRONTATIONAL VISUAL FIELD TEST (CVF)
OD_FINDINGS: FULL
OS_FINDINGS: FULL

## 2023-08-21 ASSESSMENT — VISUAL ACUITY
OS_BCVA: 20/20-1
OD_BCVA: 20/20-1

## 2023-08-21 ASSESSMENT — TONOMETRY: OS_IOP_MMHG: 13
